# Patient Record
Sex: FEMALE | Race: WHITE | NOT HISPANIC OR LATINO | Employment: OTHER | URBAN - METROPOLITAN AREA
[De-identification: names, ages, dates, MRNs, and addresses within clinical notes are randomized per-mention and may not be internally consistent; named-entity substitution may affect disease eponyms.]

---

## 2017-01-31 ENCOUNTER — TRANSFERRED RECORDS (OUTPATIENT)
Dept: HEALTH INFORMATION MANAGEMENT | Facility: CLINIC | Age: 67
End: 2017-01-31

## 2017-02-28 ENCOUNTER — TRANSFERRED RECORDS (OUTPATIENT)
Dept: HEALTH INFORMATION MANAGEMENT | Facility: CLINIC | Age: 67
End: 2017-02-28

## 2017-08-02 NOTE — PROGRESS NOTES
[address] [address]  [phone]  Dept: [phone]    PRE-OP EVALUATION:  Today's date: 8/3/2017    Suni Avila (: 1950) presents for pre-operative evaluation assessment as requested by Dr. Phong hendrickson.  She requires evaluation and anesthesia risk assessment prior to undergoing surgery/procedure for treatment of foot, toes .  Proposed procedure:     Date of Surgery/ Procedure: 17  Time of Surgery/ Procedure: [facility]  Hospital/Surgical Facility: [facility]    Primary Physician: Akil Pal  Type of Anesthesia Anticipated: General    Patient has a Health Care Directive or Living Will:  NO      The patient is having surgery due to foot pain.  She otherwise feels fine.  No chest pain or shortness of breath and can do 4 mets.                Past Medical History:      Past Medical History:   Diagnosis Date     Breast cancer (H)     left, bilat mast and reconstruction, Dr. Cuevas     Col[place] polyp [place]      DCIS (ductal carcinoma in situ) of breast 2004    had xrt     Screening ,     dexa nl at gyn, dexa nl 10/14             Past Surgical History:      Past Surgical History:   Procedure Laterality Date     APPENDECTOMY       ARTHROPLASTY KNEE UNICOMPARTMENT  2012    Procedure: ARTHROPLASTY KNEE UNICOMPARTMENT;  RIGHT KNEE UNICOMPARTMENTAL ARTHROPLASTY ;  Surgeon: Dakota Haines MD;  Location: [facility]     ARTHROPLASTY KNEE UNICOMPARTMENT Left 2016    Procedure: ARTHROPLASTY KNEE UNICOMPARTMENT;  Surgeon: Dakota Haines MD;  Location:  OR     BREAST RECONSTRUCTION WITH DEEP INFERIOR EPIGASTRIC  (ABRAHAM) FLAP,       bunion surgery   and      BUNIONECTOMY IVETH  2012    Procedure:BUNIONECTOMY IVETH; RIGHT HALLUX VALGUS WITH SECOND CLAWTOE RECONSTRUCTION; Surgeon:PHONG HENDRICKSON; Location:[facility] OR     EXCISE LESION TRUNK N/A 2015    Procedure: EXCISE  "LESION TRUNK;  Surgeon: Jose Luis Hawkins MD;  Location: [facility]     fractured arm  2009     MASTECTOMY BILATERAL, INSERT TISSUE EXPANDER BILATERAL, COMBINED  2008    breast cancer     RECONSTRUCT BREAST Left 11/13/2015    Procedure: RECONSTRUCT BREAST;  Surgeon: Jose Luis Hawkins MD;  Location: [facility]     REVISE RECONSTRUCTED BREAST  1/27/2012    Procedure:REVISE RECONSTRUCTED BREAST; LEFT BREAST CAPSULORRAPHY; Surgeon:JOSE LUIS HAWKINS; Location:[facility]     TOE SURGERY  2014             Social History:     Social History   Substance Use Topics     Smoking status: Former Smoker     Years: 20.00     Quit date: 1/21/2005     Smokeless tobacco: Never Used     Alcohol use 0.0 oz/week     0 Standard drinks or equivalent per week      Comment: 3-4 drinks monthly             Family History:   No family history of bleeding difficulty, anesthesia problems or blood clots.         Allergies:     Allergies   Allergen Reactions     No Known Drug Allergy      Latex Rash             Medications:     Current Outpatient Prescriptions   Medication Sig Dispense Refill     aspirin 325 MG tablet Take 1 tablet (325 mg) by mouth 2 times daily 20 tablet 0     Multiple Vitamins-Minerals (MULTIVITAMIN GUMMIES ADULTS) CHEW Take 2 chew tab by mouth daily       Ibuprofen (ADVIL PO) Take 400-600 mg by mouth nightly as needed                  Review of Systems:   No history of bleeding difficulty, anesthesia problems or blood clots.  The 10 point Review of Systems is negative other than noted in the HPI           Physical Exam:   Blood pressure 128/72, temperature 98.1  F (36.7  C), temperature source Oral, height 5' 5\" (1.651 m), weight 164 lb (74.4 kg), SpO2 97 %, not currently breastfeeding.    Constitutional: healthy appearing, alert and in no distress  Heent: Normocephalic. Head without obvious masses or lesions. PERRLDC, EOMI. Mouth exam within normal limits: tongue, mucous membranes, posterior pharynx all normal, no " lesions or abnormalities seen.  Tm's and canals within normal limits bilaterally. Neck supple, no nuchal rigidity or masses. No supraclavicular, or cervical adenopathy. Thyroid symmetric, no masses.  Cardiovascular: Regular rate and rhythm, no murmer, rub or gallops.  JVP not elevated, no edema.  Carotids within normal limits bilaterally, no bruits.  Respiratory: Normal respiratory effort.  Lungs clear, normal flow, no wheezing or crackles.  Gastrointestinal: Normal active bowel sounds.   Soft, not tender, no masses, guarding or rebound.  No hepatosplenomegaly.   Musculoskeletal: extremities normal, no gross deformities noted.  Skin: no suspicious lesions or rashes   Neurologic: Mental status within normal limits.  Speech fluent.  No gross motor abnormalities and gait intact.  Psychiatric: mentation appears normal and affect normal.         Data:   ekg - nsr, within normal limits.        Assessment:   1. Normal pre op exam, this patient should be low risk for the procedure  2. Health care maintenance  3. Ca breast, juan pablo  4. Colon polyp, will get follow up once recovered         Plan:   The patient is ok for the procedure  Pneumovax today  Hep c screen today  Colon in future      Akil Pal M.D.

## 2017-08-03 ENCOUNTER — OFFICE VISIT (OUTPATIENT)
Dept: FAMILY MEDICINE | Facility: CLINIC | Age: 67
End: 2017-08-03
Payer: MEDICARE

## 2017-08-03 VITALS
HEIGHT: 65 IN | BODY MASS INDEX: 27.32 KG/M2 | DIASTOLIC BLOOD PRESSURE: 72 MMHG | WEIGHT: 164 LBS | SYSTOLIC BLOOD PRESSURE: 128 MMHG | OXYGEN SATURATION: 97 % | TEMPERATURE: 98.1 F

## 2017-08-03 DIAGNOSIS — C50.912 MALIGNANT NEOPLASM OF LEFT FEMALE BREAST, UNSPECIFIED ESTROGEN RECEPTOR STATUS, UNSPECIFIED SITE OF BREAST (H): ICD-10-CM

## 2017-08-03 DIAGNOSIS — K63.5 POLYP OF COLON, UNSPECIFIED PART OF COLON, UNSPECIFIED TYPE: ICD-10-CM

## 2017-08-03 DIAGNOSIS — Z23 NEED FOR VACCINATION: ICD-10-CM

## 2017-08-03 DIAGNOSIS — Z11.59 NEED FOR HEPATITIS C SCREENING TEST: ICD-10-CM

## 2017-08-03 DIAGNOSIS — M79.674 PAIN OF TOE OF RIGHT FOOT: ICD-10-CM

## 2017-08-03 DIAGNOSIS — Z01.818 PREOP GENERAL PHYSICAL EXAM: Primary | ICD-10-CM

## 2017-08-03 DIAGNOSIS — Z23 ENCOUNTER FOR IMMUNIZATION: ICD-10-CM

## 2017-08-03 PROCEDURE — 99214 OFFICE O/P EST MOD 30 MIN: CPT | Mod: 25 | Performed by: INTERNAL MEDICINE

## 2017-08-03 PROCEDURE — 90732 PPSV23 VACC 2 YRS+ SUBQ/IM: CPT | Performed by: INTERNAL MEDICINE

## 2017-08-03 PROCEDURE — G0009 ADMIN PNEUMOCOCCAL VACCINE: HCPCS | Performed by: INTERNAL MEDICINE

## 2017-08-03 PROCEDURE — 93000 ELECTROCARDIOGRAM COMPLETE: CPT | Performed by: INTERNAL MEDICINE

## 2017-08-03 PROCEDURE — 36415 COLL VENOUS BLD VENIPUNCTURE: CPT | Performed by: INTERNAL MEDICINE

## 2017-08-03 PROCEDURE — G0472 HEP C SCREEN HIGH RISK/OTHER: HCPCS | Performed by: INTERNAL MEDICINE

## 2017-08-03 NOTE — PROGRESS NOTES
Screening Questionnaire for Adult Immunization    Are you sick today?   No   Do you have allergies to medications, food, a vaccine component or latex?   No   Have you ever had a serious reaction after receiving a vaccination?   No   Do you have a long-term health problem with heart disease, lung disease, asthma, kidney disease, metabolic disease (e.g. diabetes), anemia, or other blood disorder?   No   Do you have cancer, leukemia, HIV/AIDS, or any other immune system problem?   No   In the past 3 months, have you taken medications that affect  your immune system, such as prednisone, other steroids, or anticancer drugs; drugs for the treatment of rheumatoid arthritis, Crohn s disease, or psoriasis; or have you had radiation treatments?   No   Have you had a seizure, or a brain or other nervous system problem?   No   During the past year, have you received a transfusion of blood or blood     products, or been given immune (gamma) globulin or antiviral drug?   No   For women: Are you pregnant or is there a chance you could become        pregnant during the next month?   No   Have you received any vaccinations in the past 4 weeks?   No     Immunization questionnaire answers were all negative.      MNVFC doesn't apply on this patient    Per orders of Dr. Pal, injection of Pneumovax  given by Tawnya Tadeo. Patient instructed to remain in clinic for 15 minutes afterwards, and to report any adverse reaction to me immediately.       Screening performed by Tawnya Tadeo on 8/3/2017 at 1:38 PM.    Prior to injection verified patient identity using patient's name and date of birth.

## 2017-08-03 NOTE — LETTER
[facility]  [address] [facility]  [address]  [place]  [address]  Tel: [phone]    August 4, 2017    Suni Avila  [address]   [facility] [address]        Dear MsLaura Avila,    It was very nice to see you.  You do not have hepatitis C    If you have any further questions or problems, please contact our office.      Sincerely,    Akil Pal MD/ Tena SHEPPARD CMA  Results for orders placed or performed in visit on 08/03/17   Hepatitis C Screen Reflex to HCV RNA Quant and Genotype   Result Value Ref Range    Hepatitis C Antibody  NR     Nonreactive   Assay performance characteristics have not been established for newborns,   infants, and children                 Enclosure: Lab Results

## 2017-08-03 NOTE — MR AVS SNAPSHOT
After Visit Summary   8/3/2017    Suni Avila    MRN: [id number]           Patient Information     Date Of Birth          1950        Visit Information        Provider Department      8/3/2017 1:00 PM Akil Pal MD [facility]        Today's Diagnoses     Preop general physical exam    -  1      Care Instructions      Before Your Surgery      Call your surgeon if there is any change in your health. This includes signs of a cold or flu (such as a sore throat, runny nose, cough, rash or fever).    Do not smoke, drink alcohol or take over the counter medicine (unless your surgeon or primary care doctor tells you to) for the 24 hours before and after surgery.    If you take prescribed drugs: Follow your doctor s orders about which medicines to take and which to stop until after surgery.    Eating and drinking prior to surgery: follow the instructions from your surgeon    Take a shower or bath the night before surgery. Use the soap your surgeon gave you to gently clean your skin. If you do not have soap from your surgeon, use your regular soap. Do not shave or scrub the surgery site.  Wear clean pajamas and have clean sheets on your bed.           Follow-ups after your visit        Who to contact     If you have questions or need follow up information about today's clinic visit or your schedule please contact [facility] directly at [phone].  Normal or non-critical lab and imaging results will be communicated to you by MyChart, letter or phone within 4 business days after the clinic has received the results. If you do not hear from us within 7 days, please contact the clinic through MyChart or phone. If you have a critical or abnormal lab result, we will notify you by phone as soon as possible.  Submit refill requests through [employer] or call your pharmacy and they will forward the refill request to us. Please allow 3 business days for your refill to be completed.  "         Additional Information About Your Visit        MyChart Information     [employer] lets you send messages to your doctor, view your test results, renew your prescriptions, schedule appointments and more. To sign up, go to [email or web address] . Click on \"Log in\" on the left side of the screen, which will take you to the Welcome page. Then click on \"Sign up Now\" on the right side of the page.     You will be asked to enter the access code listed below, as well as some personal information. Please follow the directions to create your username and password.     Your access code is: [email or web address]  Expires: 2017  1:08 PM     Your access code will  in 90 days. If you need help or a new code, please call your [place] clinic or [phone].        Care EveryWhere ID     This is your Care EveryWhere ID. This could be used by other organizations to access your [place] medical records  [id number]        Your Vitals Were     Temperature Height Pulse Oximetry Breastfeeding? BMI (Body Mass Index)       98.1  F (36.7  C) (Oral) 5' 5\" (1.651 m) 97% No 27.29 kg/m2        Blood Pressure from Last 3 Encounters:   17 128/72   16 144/78   16 122/75    Weight from Last 3 Encounters:   17 164 lb (74.4 kg)   16 165 lb (74.8 kg)   16 165 lb (74.8 kg)              Today, you had the following     No orders found for display         Today's Medication Changes          These changes are accurate as of: 8/3/17  1:09 PM.  If you have any questions, ask your nurse or doctor.               These medicines have changed or have updated prescriptions.        Dose/Directions    ADVIL PO   This may have changed:  Another medication with the same name was removed. Continue taking this medication, and follow the directions you see here.        Dose:  400-600 mg   Take 400-600 mg by mouth nightly as needed   Refills:  0         Stop taking these medicines if you haven't already. Please contact " your care team if you have questions.     acetaminophen-codeine 300-30 MG per tablet   Commonly known as:  TYLENOL #3   Stopped by:  Akil Pal MD           ALEVE PO   Stopped by:  Akil Pal MD           oxyCODONE 5 MG IR tablet   Commonly known as:  ROXICODONE   Stopped by:  kAil Pal MD           senna-docusate 8.6-50 MG per tablet   Commonly known as:  SENOKOT-S;PERICOLACE   Stopped by:  Akil Pal MD                    Primary Care Provider Office Phone # Fax #    Akil Pal [phone]       [facility] [address] [facility] [address]  [facility] [address]        Equal Access to Services     [facility] : Hadii aad ku hadasho Soomaali, waaxda luqadaha, qaybta kaalmada adeegyada, waxay idiin haybassamn craig chen . So [facility] [phone].    ATENCIÓN: Si habla español, tiene a lion disposición servicios gratuitos de asistencia lingüística. Llame al [phone].    We comply with applicable federal civil rights laws and [place] laws. We do not discriminate on the basis of race, color, national origin, age, disability sex, sexual orientation or gender identity.            Thank you!     Thank you for choosing [facility]  for your care. Our goal is always to provide you with excellent care. Hearing back from our patients is one way we can continue to improve our services. Please take a few minutes to complete the written survey that you may receive in the mail after your visit with us. Thank you!             Your Updated Medication List - Protect others around you: Learn how to safely use, store and throw away your medicines at www.disposemymeds.org.          This list is accurate as of: 8/3/17  1:09 PM.  Always use your most recent med list.                   Brand Name Dispense Instructions for use Diagnosis    ADVIL PO      Take 400-600 mg by mouth nightly as needed        aspirin 325 MG tablet     20 tablet    Take 1  tablet (325 mg) by mouth 2 times daily    History of arthroplasty of right knee       MULTIVITAMIN GUMMIES ADULTS Chew      Take 2 chew tab by mouth daily

## 2017-08-03 NOTE — NURSING NOTE
"Chief Complaint   Patient presents with     Pre-Op Exam       Initial /82  Temp 98.1  F (36.7  C) (Oral)  Ht 5' 5\" (1.651 m)  Wt 164 lb (74.4 kg)  SpO2 97%  Breastfeeding? No  BMI 27.29 kg/m2 Estimated body mass index is 27.29 kg/(m^2) as calculated from the following:    Height as of this encounter: 5' 5\" (1.651 m).    Weight as of this encounter: 164 lb (74.4 kg).  Medication Reconciliation: complete   Tena SHEPPARD CMA      "

## 2017-08-04 LAB — HCV AB SERPL QL IA: NORMAL

## 2017-08-04 NOTE — PROGRESS NOTES
It was very nice to see you.  You do not have hepatitis C.  If you have any questions please call me.

## 2017-09-26 ENCOUNTER — TRANSFERRED RECORDS (OUTPATIENT)
Dept: HEALTH INFORMATION MANAGEMENT | Facility: CLINIC | Age: 67
End: 2017-09-26

## 2018-01-09 ENCOUNTER — TRANSFERRED RECORDS (OUTPATIENT)
Dept: HEALTH INFORMATION MANAGEMENT | Facility: CLINIC | Age: 68
End: 2018-01-09

## 2018-10-18 ENCOUNTER — TRANSFERRED RECORDS (OUTPATIENT)
Dept: HEALTH INFORMATION MANAGEMENT | Facility: CLINIC | Age: 68
End: 2018-10-18

## 2018-11-05 ENCOUNTER — RADIANT APPOINTMENT (OUTPATIENT)
Dept: GENERAL RADIOLOGY | Facility: CLINIC | Age: 68
End: 2018-11-05
Attending: INTERNAL MEDICINE
Payer: MEDICARE

## 2018-11-05 ENCOUNTER — OFFICE VISIT (OUTPATIENT)
Dept: FAMILY MEDICINE | Facility: CLINIC | Age: 68
End: 2018-11-05
Payer: MEDICARE

## 2018-11-05 VITALS
SYSTOLIC BLOOD PRESSURE: 144 MMHG | HEART RATE: 67 BPM | BODY MASS INDEX: 28.12 KG/M2 | TEMPERATURE: 97.3 F | DIASTOLIC BLOOD PRESSURE: 88 MMHG | OXYGEN SATURATION: 97 % | WEIGHT: 169 LBS

## 2018-11-05 DIAGNOSIS — R06.02 SOB (SHORTNESS OF BREATH): ICD-10-CM

## 2018-11-05 DIAGNOSIS — C50.912 MALIGNANT NEOPLASM OF LEFT FEMALE BREAST, UNSPECIFIED ESTROGEN RECEPTOR STATUS, UNSPECIFIED SITE OF BREAST (H): ICD-10-CM

## 2018-11-05 DIAGNOSIS — R03.0 ELEVATED BP WITHOUT DIAGNOSIS OF HYPERTENSION: ICD-10-CM

## 2018-11-05 DIAGNOSIS — R05.9 COUGH: Primary | ICD-10-CM

## 2018-11-05 DIAGNOSIS — R05.9 COUGH: ICD-10-CM

## 2018-11-05 PROCEDURE — 99213 OFFICE O/P EST LOW 20 MIN: CPT | Performed by: INTERNAL MEDICINE

## 2018-11-05 PROCEDURE — 71046 X-RAY EXAM CHEST 2 VIEWS: CPT

## 2018-11-05 NOTE — MR AVS SNAPSHOT
After Visit Summary   11/5/2018    Suni Avila    MRN: [id number]           Patient Information     Date Of Birth          1950        Visit Information        Provider Department      11/5/2018 12:30 PM Akil Pal MD [facility]        Today's Diagnoses     Cough    -  1    SOB (shortness of breath)        Elevated BP without diagnosis of hypertension        Malignant neoplasm of left female breast, unspecified estrogen receptor status, unspecified site of breast (H)           Follow-ups after your visit        Follow-up notes from your care team     Return in about 1 year (around 11/5/2019) for Physical Exam.      Future tests that were ordered for you today     Open Future Orders        Priority Expected Expires Ordered    XR Chest 2 Views Routine 11/5/2018 11/5/2019 11/5/2018            Who to contact     If you have questions or need follow up information about today's clinic visit or your schedule please contact [facility] directly at [phone].  Normal or non-critical lab and imaging results will be communicated to you by MyChart, letter or phone within 4 business days after the clinic has received the results. If you do not hear from us within 7 days, please contact the clinic through MyChart or phone. If you have a critical or abnormal lab result, we will notify you by phone as soon as possible.  Submit refill requests through [employer] or call your pharmacy and they will forward the refill request to us. Please allow 3 business days for your refill to be completed.          Additional Information About Your Visit        Care EveryWhere ID     This is your Care EveryWhere ID. This could be used by other organizations to access your [place] medical records  [id number]        Your Vitals Were     Pulse Temperature Pulse Oximetry BMI (Body Mass Index)          67 97.3  F (36.3  C) (Tympanic) 97% 28.12 kg/m2         Blood Pressure from Last 3  Encounters:   11/05/18 144/88   08/03/17 128/72   05/25/16 144/78    Weight from Last 3 Encounters:   11/05/18 169 lb (76.7 kg)   08/03/17 164 lb (74.4 kg)   05/24/16 165 lb (74.8 kg)                 Today's Medication Changes          These changes are accurate as of 11/5/18 12:45 PM.  If you have any questions, ask your nurse or doctor.               Stop taking these medicines if you haven't already. Please contact your care team if you have questions.     ADVIL PO   Stopped by:  Akil Pal MD                    Primary Care Provider Office Phone # Fax #    Akil Pal [phone] [address] [address]        Equal Access to Services     [facility]: Ivory madseno Sotyler, waaxda luqadaha, qaybta kaalmakylee mejia, clark chen . So [facility] [phone].    ATENCIÓN: Si habla español, tiene a lion disposición servicios gratuitos de asistencia lingüística. Yolis al [phone].    We comply with applicable federal civil rights laws and [place] laws. We do not discriminate on the basis of race, color, national origin, age, disability, sex, sexual orientation, or gender identity.            Thank you!     Thank you for choosing [facility]  for your care. Our goal is always to provide you with excellent care. Hearing back from our patients is one way we can continue to improve our services. Please take a few minutes to complete the written survey that you may receive in the mail after your visit with us. Thank you!             Your Updated Medication List - Protect others around you: Learn how to safely use, store and throw away your medicines at www.disposemymeds.org.          This list is accurate as of 11/5/18 12:45 PM.  Always use your most recent med list.                   Brand Name Dispense Instructions for use Diagnosis    aspirin 325 MG tablet     20 tablet    Take 1 tablet (325 mg) by mouth 2  times daily    History of arthroplasty of right knee       MULTIVITAMIN GUMMIES ADULTS Chew      Take 2 chew tab by mouth daily

## 2018-11-05 NOTE — PROGRESS NOTES
The patient is here for a cough.  She had a cold in September with head congestion, sore throat and a cough that lingered but then went away for a couple days.  However, as of early October she has had a nagging nocturnal cough.  She is not coughing during the day.  She has no history of lung disease and she does not smoke.  She is not having any earache or sore throat.  No fevers or night sweats.    This last Saturday she woke in the middle of the night feeling short of breath and coughing.  She was not having chest pain.  Since then she has not had any more shortness of breath.  She has not had chest pain, PND or edema.  No fevers or night sweats.  The cough is not productive.  Some nasal congestion but no facial pain or pressure.  No travel.  No nausea or vomiting.  Of note her  was just diagnosed with pneumonia.    Her blood pressure is also slightly elevated today.  It has not been in the past.    She has history of breast cancer but this is not been active in quite some time.    Past Medical History:   Diagnosis Date     Colonic polyp March 2012    [place] fu 2017     DCIS (ductal carcinoma in situ) of breast 2004    had xrt     History of breast cancer 2007    left, bilat mast and reconstruction, Dr. Cuevas     Screening 2003, 2014    dexa nl at gyn, dexa nl 10/14     Past Surgical History:   Procedure Laterality Date     APPENDECTOMY  2002     ARTHROPLASTY KNEE UNICOMPARTMENT  9/13/2012    Procedure: ARTHROPLASTY KNEE UNICOMPARTMENT;  RIGHT KNEE UNICOMPARTMENTAL ARTHROPLASTY ;  Surgeon: Dakota Haines MD;  Location:  OR     ARTHROPLASTY KNEE UNICOMPARTMENT Left 5/24/2016    Procedure: ARTHROPLASTY KNEE UNICOMPARTMENT;  Surgeon: Dakota Haines MD;  Location:  OR     BREAST RECONSTRUCTION WITH DEEP INFERIOR EPIGASTRIC  (ABRAHAM) FLAP,  2009     bunion surgery  1975 and 2007     BUNIONECTOMY IVETH  5/9/2012    Procedure:BUNIONECTOMY IVETH; RIGHT HALLUX VALGUS WITH  SECOND CLAWTOE RECONSTRUCTION; Surgeon:PHONG HENDRICKSON; Location: OR     EXCISE LESION TRUNK N/A 11/13/2015    Procedure: EXCISE LESION TRUNK;  Surgeon: Jose Luis Hawkins MD;  Location: [facility]     FOOT SURGERY  08/2017    at o     fractured arm  2009     MASTECTOMY BILATERAL, INSERT TISSUE EXPANDER BILATERAL, COMBINED  2008    breast cancer     RECONSTRUCT BREAST Left 11/13/2015    Procedure: RECONSTRUCT BREAST;  Surgeon: Jose Luis Hawkins MD;  Location: [facility]     REVISE RECONSTRUCTED BREAST  1/27/2012    Procedure:REVISE RECONSTRUCTED BREAST; LEFT BREAST CAPSULORRAPHY; Surgeon:JOSE LUIS HAWKINS; Location:[facility]     TOE SURGERY  2014     Social History     Social History     Marital status:      Spouse name: N/A     Number of children: 1     Years of education: N/A     Occupational History     works dental lab      Social History Main Topics     Smoking status: Former Smoker     Years: 20.00     Quit date: 1/21/2005     Smokeless tobacco: Never Used     Alcohol use 0.0 oz/week     0 Standard drinks or equivalent per week      Comment: 3-4 drinks monthly     Drug use: No     Sexual activity: Yes     Partners: Male     Other Topics Concern     Not on file     Social History Narrative     Current Outpatient Prescriptions   Medication Sig Dispense Refill     aspirin 325 MG tablet Take 1 tablet (325 mg) by mouth 2 times daily 20 tablet 0     Multiple Vitamins-Minerals (MULTIVITAMIN GUMMIES ADULTS) CHEW Take 2 chew tab by mouth daily       Allergies   Allergen Reactions     No Known Drug Allergy      Latex Rash     FAMILY HISTORY NOTED AND REVIEWED    REVIEW OF SYSTEMS: above    PHYSICAL EXAM    /88  Pulse 67  Temp 97.3  F (36.3  C) (Tympanic)  Wt 169 lb (76.7 kg)  SpO2 97%  BMI 28.12 kg/m2    Patient appears non toxic  Tympanic membranes and canals: within normal limits bilaterally.   Mouth: Posterior pharynx, mucous membranes and tongue exam within normal limits.  Neck:  supple, no nuchal rigidity or masses.  No anterior or posterior cervical adenopathy.    Lungs: clear, normal flow and effort.  cv reglar rate and rhythm, no murmer, rub or gallop, no jvp or edema  Abdomen non-tender    cxr to be done    ASSESSMENT:  1. Prolonged cough, suspect viral, doubt pneumonia, pulmonary embolis, tumor, chf, may be post nasal discontinue, doubt sinusitis, doubt asthma, copd  2. Shortness of breath, doubt pulmonary embolis, chf, etc, suspect due to the cough, neg exam now  3. Elevated blood pressure, she will monitor it and call if up  4. Breast ca, juan pablo    PLAN:  cxr  If cxr neg then try flonase, but call if worsens, more shortness of breath or not gone soon  Monitor blood pressure     Akil Pal M.D.

## 2018-11-06 ENCOUNTER — TELEPHONE (OUTPATIENT)
Dept: FAMILY MEDICINE | Facility: CLINIC | Age: 68
End: 2018-11-06

## 2018-11-06 DIAGNOSIS — R05.9 COUGH: Primary | ICD-10-CM

## 2018-11-12 ENCOUNTER — HOSPITAL ENCOUNTER (OUTPATIENT)
Dept: RESPIRATORY THERAPY | Facility: CLINIC | Age: 68
Discharge: HOME | End: 2018-11-12
Attending: INTERNAL MEDICINE | Admitting: INTERNAL MEDICINE
Payer: MEDICARE

## 2018-11-12 VITALS — OXYGEN SATURATION: 93 %

## 2018-11-12 DIAGNOSIS — R05.9 COUGH: ICD-10-CM

## 2018-11-12 LAB
DLCOUNC-PRED: 20.7 ML/MIN/MMHG
ERV-%PRED-PRE: 96 %
ERV-PRE: 0.59 L
ERV-PRED: 0.61 L
EXPTIME-PRE: 9.52 SEC
FEF2575-%PRED-POST: 112 %
FEF2575-%PRED-PRE: 107 %
FEF2575-POST: 2.16 L/SEC
FEF2575-PRE: 2.07 L/SEC
FEF2575-PRED: 1.93 L/SEC
FEFMAX-%PRED-PRE: 124 %
FEFMAX-PRE: 7.16 L/SEC
FEFMAX-PRED: 5.77 L/SEC
FEV1-%PRED-PRE: 124 %
FEV1-PRE: 2.81 L
FEV1FEV6-PRE: 75 %
FEV1FEV6-PRED: 79 %
FEV1FVC-PRE: 73 %
FEV1FVC-PRED: 76 %
FEV1SVC-PRE: 73 %
FEV1SVC-PRED: 73 %
FIFMAX-PRE: 5.47 L/SEC
FRCPLETH-%PRED-PRE: 111 %
FRCPLETH-PRE: 3.03 L
FRCPLETH-PRED: 2.71 L
FVC-%PRED-PRE: 131 %
FVC-PRE: 3.82 L
FVC-PRED: 2.9 L
IC-%PRED-PRE: 130 %
IC-PRE: 3.25 L
IC-PRED: 2.49 L
RVPLETH-%PRED-PRE: 119 %
RVPLETH-PRE: 2.44 L
RVPLETH-PRED: 2.04 L
TLCPLETH-%PRED-PRE: 127 %
TLCPLETH-PRE: 6.28 L
TLCPLETH-PRED: 4.94 L
VC-%PRED-PRE: 123 %
VC-PRE: 3.84 L
VC-PRED: 3.1 L

## 2018-11-12 PROCEDURE — 94729 DIFFUSING CAPACITY: CPT

## 2018-11-12 PROCEDURE — 40000275 ZZH STATISTIC RCP TIME EA 10 MIN

## 2018-11-12 PROCEDURE — 94726 PLETHYSMOGRAPHY LUNG VOLUMES: CPT

## 2018-11-12 PROCEDURE — 94060 EVALUATION OF WHEEZING: CPT

## 2018-11-12 PROCEDURE — 25000125 ZZHC RX 250

## 2018-11-12 RX ORDER — ALBUTEROL SULFATE 0.83 MG/ML
SOLUTION RESPIRATORY (INHALATION)
Status: COMPLETED
Start: 2018-11-12 | End: 2018-11-12

## 2018-11-12 RX ORDER — ALBUTEROL SULFATE 0.83 MG/ML
2.5 SOLUTION RESPIRATORY (INHALATION)
Status: COMPLETED | OUTPATIENT
Start: 2018-11-12 | End: 2018-11-12

## 2018-11-12 RX ADMIN — ALBUTEROL SULFATE 2.5 MG: 2.5 SOLUTION RESPIRATORY (INHALATION) at 14:11

## 2018-11-12 RX ADMIN — ALBUTEROL SULFATE 2.5 MG: 0.83 SOLUTION RESPIRATORY (INHALATION) at 14:11

## 2018-11-12 NOTE — PROGRESS NOTES
PFT Note:        Pt completed pulmonary function testing with DLCO.  Pre/post flow volume loops with 2.5mg Albuterol nebulizer.  Good Pt effort and cooperation.     Spirometry  Meets all ATS-ERS recommendations.    Plethysmography  All plethysmographic measurements meet ATS-ERS recommendations.      DLCO  Meets all ATS-ERS recommendations.  DLCO is an average of 2 maneuvers.  No recent Hgb for DLCO correction.    November 12, 2018.2:46 PM  Yair Cash

## 2018-11-13 NOTE — PROCEDURES
Procedure Date: 2018      Please see medical chart for graphs and statistics related to this report.       REFERRING PHYSICIAN:   Akil Pal                 TECHNICIAN:   Yair Cash   DIAGNOSIS:   Coughing, SARAVIA   HEIGHT:   64.00 inches                  WEIGHT:   163.00 Lbs.      DYSPNEA:   Walking less than 100 yards   COUGH:  Productive   WHEEZE:   Rare      TOBACCO PROD:   Cigarette   YEARS SMOKED:   29.0   PKS/DAY:   0.2   YEARS QUIT:    10.0                                                              MEDICATIONS:           POST-TEST COMMENTS:   Patient completed pulmonary function testing with DLCO.  Pre/post flow  volume loops with 2.5mg Albuterol  nebulizer.  Good  patient effort t and cooperation.  All testing meets ATS-ERS recommendations.  DLCO is an average of 2 maneuvers.  No recent Hgb for DLCO correction.       INTERPRETATION:      1.  Normal lung mechanics   2.  No obstructions   3.  Borderline air trapping   4.  Hyperinflation   5.  Normal gas transfer         POLY GARRETT MD             D: 2018   T: 2018   MT: MICHAEL      Name:     KELSIE PURVIS   MRN:      [id number]        Account:        [id number]   :      1950           Procedure Date: 2018      Document: [id number]       cc: Akil Pal MD

## 2018-11-14 ENCOUNTER — TELEPHONE (OUTPATIENT)
Dept: FAMILY MEDICINE | Facility: CLINIC | Age: 68
End: 2018-11-14

## 2018-11-14 DIAGNOSIS — R05.9 COUGH: Primary | ICD-10-CM

## 2018-11-14 RX ORDER — ALBUTEROL SULFATE 90 UG/1
2 AEROSOL, METERED RESPIRATORY (INHALATION) EVERY 6 HOURS PRN
Qty: 1 INHALER | Refills: 0 | Status: SHIPPED | OUTPATIENT
Start: 2018-11-14 | End: 2019-10-29

## 2018-11-14 RX ORDER — PREDNISONE 20 MG/1
20 TABLET ORAL DAILY
Qty: 5 TABLET | Refills: 0 | Status: SHIPPED | OUTPATIENT
Start: 2018-11-14 | End: 2019-10-29

## 2018-11-14 NOTE — TELEPHONE ENCOUNTER
FYI PCP:  Spoke with patient:   Denies fever or SOB    Coughing jags mostly just at night, cough medicine at night, a little wheezing with this - improved (less frequent) a little bit, continuing to improve   Notified   Notified of scripts sent to pharmacy - briefly discussed side effects of meds    Marlene SHEPPARD RN

## 2018-11-14 NOTE — TELEPHONE ENCOUNTER
Please make sure no fever or shortness of breath.  Let's try adding prednisone for 5 days and using albuterol prn, but call if not resolving soon or worsens    Akil Pal M.D.

## 2018-11-14 NOTE — TELEPHONE ENCOUNTER
Informed patient that her pulmonary function test looked fine. Patient expressed concern with having coughing fits still roughly 2-3 times a week and they are only at night. Is there anything the patient should do to help resolve these symptoms?    Chandu Adame CMA on 11/14/2018 at 9:18 AM

## 2018-11-23 NOTE — TELEPHONE ENCOUNTER
Pt reports she feels better, still has a little cough, at night, feels like it's from drainage however is much improved.   Denies sob/breathing difficulty or any other concerns.      Completed prednisone rx, did not use inhaler, as during PFT neb didin't do anything and reports she was advised against inhaler for that reason by pulmonary team.    Advised pt to call if any worsening symptoms again, or if cough continues ongoing with no further improvement.  Jena Juarez RN

## 2018-11-23 NOTE — TELEPHONE ENCOUNTER
Please call patient re her cough.  Has it resolved?  Her pulmonary function tests look fine.  Let me know please    Akil Pal M.D.

## 2019-02-19 ENCOUNTER — TRANSFERRED RECORDS (OUTPATIENT)
Dept: HEALTH INFORMATION MANAGEMENT | Facility: CLINIC | Age: 69
End: 2019-02-19

## 2019-07-22 ENCOUNTER — TRANSFERRED RECORDS (OUTPATIENT)
Dept: HEALTH INFORMATION MANAGEMENT | Facility: CLINIC | Age: 69
End: 2019-07-22

## 2019-07-31 ENCOUNTER — TELEPHONE (OUTPATIENT)
Dept: FAMILY MEDICINE | Facility: CLINIC | Age: 69
End: 2019-07-31

## 2019-07-31 NOTE — TELEPHONE ENCOUNTER
Panel Management Review      Patient has the following on her problem list: None      Composite cancer screening  Chart review shows that this patient is due/due soon for the following None  Summary:    Patient is due/failing the following:   none    Action needed:   none    Type of outreach:    none    Questions for provider review:    None                                                                                                                                    Tawnya Schreiber CMA       Chart routed to Care Team .

## 2019-10-29 ENCOUNTER — OFFICE VISIT (OUTPATIENT)
Dept: FAMILY MEDICINE | Facility: CLINIC | Age: 69
End: 2019-10-29
Payer: MEDICARE

## 2019-10-29 VITALS
HEIGHT: 64 IN | SYSTOLIC BLOOD PRESSURE: 151 MMHG | TEMPERATURE: 97.7 F | HEART RATE: 63 BPM | DIASTOLIC BLOOD PRESSURE: 82 MMHG | BODY MASS INDEX: 28.51 KG/M2 | WEIGHT: 167 LBS | OXYGEN SATURATION: 96 %

## 2019-10-29 DIAGNOSIS — Z00.00 ROUTINE GENERAL MEDICAL EXAMINATION AT A HEALTH CARE FACILITY: Primary | ICD-10-CM

## 2019-10-29 DIAGNOSIS — C50.912 MALIGNANT NEOPLASM OF LEFT FEMALE BREAST, UNSPECIFIED ESTROGEN RECEPTOR STATUS, UNSPECIFIED SITE OF BREAST (H): ICD-10-CM

## 2019-10-29 DIAGNOSIS — K63.5 POLYP OF COLON, UNSPECIFIED PART OF COLON, UNSPECIFIED TYPE: ICD-10-CM

## 2019-10-29 DIAGNOSIS — R03.0 ELEVATED BP WITHOUT DIAGNOSIS OF HYPERTENSION: ICD-10-CM

## 2019-10-29 DIAGNOSIS — R00.2 PALPITATIONS: ICD-10-CM

## 2019-10-29 LAB
ALBUMIN SERPL-MCNC: 3.7 G/DL (ref 3.4–5)
ALP SERPL-CCNC: 141 U/L (ref 40–150)
ALT SERPL W P-5'-P-CCNC: 30 U/L (ref 0–50)
ANION GAP SERPL CALCULATED.3IONS-SCNC: 5 MMOL/L (ref 3–14)
AST SERPL W P-5'-P-CCNC: 20 U/L (ref 0–45)
BILIRUB SERPL-MCNC: 0.5 MG/DL (ref 0.2–1.3)
BUN SERPL-MCNC: 17 MG/DL (ref 7–30)
CALCIUM SERPL-MCNC: 8.5 MG/DL (ref 8.5–10.1)
CHLORIDE SERPL-SCNC: 109 MMOL/L (ref 94–109)
CHOLEST SERPL-MCNC: 201 MG/DL
CO2 SERPL-SCNC: 25 MMOL/L (ref 20–32)
CREAT SERPL-MCNC: 0.66 MG/DL (ref 0.52–1.04)
ERYTHROCYTE [DISTWIDTH] IN BLOOD BY AUTOMATED COUNT: 13.5 % (ref 10–15)
GFR SERPL CREATININE-BSD FRML MDRD: 90 ML/MIN/{1.73_M2}
GLUCOSE SERPL-MCNC: 91 MG/DL (ref 70–99)
HCT VFR BLD AUTO: 40.7 % (ref 35–47)
HDLC SERPL-MCNC: 70 MG/DL
HGB BLD-MCNC: 13.4 G/DL (ref 11.7–15.7)
LDLC SERPL CALC-MCNC: 114 MG/DL
MCH RBC QN AUTO: 29.8 PG (ref 26.5–33)
MCHC RBC AUTO-ENTMCNC: 32.9 G/DL (ref 31.5–36.5)
MCV RBC AUTO: 90 FL (ref 78–100)
NONHDLC SERPL-MCNC: 131 MG/DL
PLATELET # BLD AUTO: 234 10E9/L (ref 150–450)
POTASSIUM SERPL-SCNC: 4.3 MMOL/L (ref 3.4–5.3)
PROT SERPL-MCNC: 6.7 G/DL (ref 6.8–8.8)
RBC # BLD AUTO: 4.5 10E12/L (ref 3.8–5.2)
SODIUM SERPL-SCNC: 139 MMOL/L (ref 133–144)
TRIGL SERPL-MCNC: 85 MG/DL
TSH SERPL DL<=0.005 MIU/L-ACNC: 1.93 MU/L (ref 0.4–4)
WBC # BLD AUTO: 5.9 10E9/L (ref 4–11)

## 2019-10-29 PROCEDURE — 85027 COMPLETE CBC AUTOMATED: CPT | Performed by: INTERNAL MEDICINE

## 2019-10-29 PROCEDURE — 36415 COLL VENOUS BLD VENIPUNCTURE: CPT | Performed by: INTERNAL MEDICINE

## 2019-10-29 PROCEDURE — 84443 ASSAY THYROID STIM HORMONE: CPT | Performed by: INTERNAL MEDICINE

## 2019-10-29 PROCEDURE — 99213 OFFICE O/P EST LOW 20 MIN: CPT | Mod: 25 | Performed by: INTERNAL MEDICINE

## 2019-10-29 PROCEDURE — 80061 LIPID PANEL: CPT | Performed by: INTERNAL MEDICINE

## 2019-10-29 PROCEDURE — 80053 COMPREHEN METABOLIC PANEL: CPT | Performed by: INTERNAL MEDICINE

## 2019-10-29 PROCEDURE — G0438 PPPS, INITIAL VISIT: HCPCS | Performed by: INTERNAL MEDICINE

## 2019-10-29 PROCEDURE — 93000 ELECTROCARDIOGRAM COMPLETE: CPT | Performed by: INTERNAL MEDICINE

## 2019-10-29 ASSESSMENT — ACTIVITIES OF DAILY LIVING (ADL): CURRENT_FUNCTION: NO ASSISTANCE NEEDED

## 2019-10-29 ASSESSMENT — MIFFLIN-ST. JEOR: SCORE: 1267.51

## 2019-10-29 NOTE — LETTER
[facility]  [address] [facility]  [address]  [place]  [address]  Tel: [phone]    October 30, 2019    Suni Avila  [address]   [facility] [address]        Dear Ms. Avila,    It was a pleasure seeing you for your physical examination.  I wanted to get back to you with your test results.  I have enclosed a copy for your review.     I am happy to report that your cbc or complete blood count is normal with no signs of anemia, leukemia or platelet abnormalities. Your chemistry panel shows no signs of diabetes.  Your blood salts, kidney tests, liver tests, thyroid test, and proteins are all fine.    Your total cholesterol is 201 with the normal range being below 200.  Your HDL or good cholesterol is 70 with the normal range being above 50.  Your LDL or bad cholesterol is 114 with the normal range being below 130.  Given the good amount of hdl the numbers are fine.    I am happy to bring you this overall excellent report.  Please get the stress test and monitor your blood pressure.  If you have any questions please call me.  Sincerely,    Akil Pal MD/ELIO          Enclosure: Lab Results  Results for orders placed or performed in visit on 10/29/19   CBC with platelets   Result Value Ref Range    WBC 5.9 4.0 - 11.0 10e9/L    RBC Count 4.50 3.8 - 5.2 10e12/L    Hemoglobin 13.4 11.7 - 15.7 g/dL    Hematocrit 40.7 35.0 - 47.0 %    MCV 90 78 - 100 fl    MCH 29.8 26.5 - 33.0 pg    MCHC 32.9 31.5 - 36.5 g/dL    RDW 13.5 10.0 - 15.0 %    Platelet Count 234 150 - 450 10e9/L   Comprehensive metabolic panel   Result Value Ref Range    Sodium 139 133 - 144 mmol/L    Potassium 4.3 3.4 - 5.3 mmol/L    Chloride 109 94 - 109 mmol/L    Carbon Dioxide 25 20 - 32 mmol/L    Anion Gap 5 3 - 14 mmol/L    Glucose 91 70 - 99 mg/dL    Urea Nitrogen 17 7 - 30 mg/dL    Creatinine 0.66 0.52 - 1.04 mg/dL    GFR Estimate 90 >60 mL/min/[1.73_m2]    GFR Estimate If Black >90 >60 mL/min/[1.73_m2]    Calcium 8.5 8.5  - 10.1 mg/dL    Bilirubin Total 0.5 0.2 - 1.3 mg/dL    Albumin 3.7 3.4 - 5.0 g/dL    Protein Total 6.7 (L) 6.8 - 8.8 g/dL    Alkaline Phosphatase 141 40 - 150 U/L    ALT 30 0 - 50 U/L    AST 20 0 - 45 U/L   Lipid panel reflex to direct LDL Non-fasting   Result Value Ref Range    Cholesterol 201 (H) <200 mg/dL    Triglycerides 85 <150 mg/dL    HDL Cholesterol 70 >49 mg/dL    LDL Cholesterol Calculated 114 (H) <100 mg/dL    Non HDL Cholesterol 131 (H) <130 mg/dL   TSH with free T4 reflex   Result Value Ref Range    TSH 1.93 0.40 - 4.00 mU/L

## 2019-10-29 NOTE — PROGRESS NOTES
SUBJECTIVE:   Suni Avila is a 69 year old female who presents for Preventive Visit.      Overall she is doing quite well.  However, she has had some heart symptoms.  They come and go usually under stress.  It is 1 month or more in between.  She does not have chest pain or shortness of breath or dizziness.  She just feels her heart is beating harder.  Is not really beating faster.    She otherwise feels quite well.  She does work out some.  She needs a colon exam.  Her blood pressure is elevated but on her checks it is been fine.  She leaves for [place] in December.  She sees gyn               Past Medical History:      Past Medical History:   Diagnosis Date     Colonic polyp March 2012    [place] [facility], tubular adenoma, fu 2017     DCIS (ductal carcinoma in situ) of breast 2004    had xrt     History of breast cancer 2007    left, bilat mast and reconstruction, Dr. Cuevas     Screening 2003, 2014    dexa nl at gyn, dexa nl 10/14             Past Surgical History:      Past Surgical History:   Procedure Laterality Date     APPENDECTOMY  2002     ARTHROPLASTY KNEE UNICOMPARTMENT  9/13/2012    Procedure: ARTHROPLASTY KNEE UNICOMPARTMENT;  RIGHT KNEE UNICOMPARTMENTAL ARTHROPLASTY ;  Surgeon: Dakota Haines MD;  Location:  OR     ARTHROPLASTY KNEE UNICOMPARTMENT Left 5/24/2016    Procedure: ARTHROPLASTY KNEE UNICOMPARTMENT;  Surgeon: Dakota Haines MD;  Location:  OR     BREAST RECONSTRUCTION WITH DEEP INFERIOR EPIGASTRIC  (ABRAHAM) FLAP,  2009     bunion surgery  1975 and 2007     BUNIONECTOMY IVETH  5/9/2012    Procedure:BUNIONECTOMY IVETH; RIGHT HALLUX VALGUS WITH SECOND CLAWTOE RECONSTRUCTION; Surgeon:PHONG HENDRICKSON; Location: OR     EXCISE LESION TRUNK N/A 11/13/2015    Procedure: EXCISE LESION TRUNK;  Surgeon: Jose Luis Hawkins MD;  Location:  [facility]     FOOT SURGERY  08/2017    at [facility]     fractured arm  2009     MASTECTOMY BILATERAL, INSERT TISSUE EXPANDER BILATERAL, COMBINED  2008     breast cancer     RECONSTRUCT BREAST Left 2015    Procedure: RECONSTRUCT BREAST;  Surgeon: Jose Luis Hawkins MD;  Location: [facility]     REVISE RECONSTRUCTED BREAST  2012    Procedure:REVISE RECONSTRUCTED BREAST; LEFT BREAST CAPSULORRAPHY; Surgeon:JOSE LUIS HAWKINS; Location:[facility]     TOE SURGERY               Social History:     Social History     Socioeconomic History     Marital status:      Spouse name: Not on file     Number of children: 1     Years of education: Not on file     Highest education level: Not on file   Occupational History     Occupation: works dental lab   Social Needs     Financial resource strain: Not on file     Food insecurity:     Worry: Not on file     Inability: Not on file     Transportation needs:     Medical: Not on file     Non-medical: Not on file   Tobacco Use     Smoking status: Former Smoker     Years: 20.00     Last attempt to quit: 2005     Years since quittin.7     Smokeless tobacco: Never Used   Substance and Sexual Activity     Alcohol use: Yes     Alcohol/week: 0.0 standard drinks     Comment: 3-4 drinks monthly     Drug use: No     Sexual activity: Yes     Partners: Male   Lifestyle     Physical activity:     Days per week: Not on file     Minutes per session: Not on file     Stress: Not on file   Relationships     Social connections:     Talks on phone: Not on file     Gets together: Not on file     Attends Sabianism service: Not on file     Active member of club or organization: Not on file     Attends meetings of clubs or organizations: Not on file     Relationship status: Not on file     Intimate partner violence:     Fear of current or ex partner: Not on file     Emotionally abused: Not on file     Physically abused: Not on file     Forced sexual activity: Not on file   Other Topics Concern     Parent/sibling w/ CABG, MI or angioplasty before 65F 55M? Not Asked   Social History Narrative     Not on file             Family  "History:   reviewed         Allergies:     Allergies   Allergen Reactions     No Known Drug Allergy      Latex Rash             Medications:     Current Outpatient Medications   Medication Sig Dispense Refill     Multiple Vitamins-Minerals (MULTIVITAMIN GUMMIES ADULTS) CHEW Take 2 chew tab by mouth daily                 Review of Systems:   The 10 point Review of Systems is negative other than noted in the HPI           Physical Exam:   Blood pressure (!) 151/82, pulse 63, temperature 97.7  F (36.5  C), temperature source Oral, height 1.626 m (5' 4\"), weight 75.8 kg (167 lb), SpO2 96 %, not currently breastfeeding.    Exam:  Constitutional: healthy appearing, alert and in no distress  Heent: Normocephalic. Head without obvious masses or lesions. PERRLDC, EOMI. Mouth exam within normal limits: tongue, mucous membranes, posterior pharynx all normal, no lesions or abnormalities seen.  Tm's and canals within normal limits bilaterally. Neck supple, no nuchal rigidity or masses. No supraclavicular, or cervical adenopathy. Thyroid symmetric, no masses.  Cardiovascular: Regular rate and rhythm, no murmer, rub or gallops.  JVP not elevated, no edema.  Carotids within normal limits bilaterally, no bruits.  Respiratory: Normal respiratory effort.  Lungs clear, normal flow, no wheezing or crackles.  Gastrointestinal: Normal active bowel sounds.   Soft, not tender, no masses, guarding or rebound.  No hepatosplenomegaly.   Musculoskeletal: extremities normal, no gross deformities noted.  Skin: no suspicious lesions or rashes   Neurologic: Mental status within normal limits.  Speech fluent.  No gross motor abnormalities and gait intact.  Psychiatric: mentation appears normal and affect normal.         Data:   Labs sent; ekg - sinus jeff, lahb        Assessment:   1. Normal complete physical exam  2. Palp, doubt significant but will check est to be safe  3. Breast ca, juan pablo  4. Colon polyp, follow up ordered  5. Elevated blood " "pressure, monitor it and call if up  6. hcm         Plan:   shingrix at pharm  Est echo  Monitor blood pressure  Exercise, diet  Letter with labs      Akil Pal M.D.              Are you in the first 12 months of your Medicare coverage?  No    Healthy Habits:    In general, how would you rate your overall health?  Very good    Frequency of exercise:  4-5 days/week    Duration of exercise:  45-60 minutes    Do you usually eat at least 4 servings of fruit and vegetables a day, include whole grains    & fiber and avoid regularly eating high fat or \"junk\" foods?  Yes    Taking medications regularly:  Not Applicable    Barriers to taking medications:  Not applicable    Medication side effects:  Not applicable    Ability to successfully perform activities of daily living:  No assistance needed    Home Safety:  No safety concerns identified    Hearing Impairment:  No hearing concerns    In the past 6 months, have you been bothered by leaking of urine?  No    In general, how would you rate your overall mental or emotional health?  Very good      PHQ-2 Total Score:    Do you feel safe in your environment? Yes    Do you have a Health Care Directive? Yes: Advance Directive has been received and scanned.      Fall risk  Fallen 2 or more times in the past year?: No  Any fall with injury in the past year?: No    Cognitive Screening   1) Repeat 3 items (Leader, Season, Table)    2) Clock draw: NORMAL  3) 3 item recall: Recalls 3 objects  Results: 3 items recalled: COGNITIVE IMPAIRMENT LESS LIKELY    Mini-CogTM Copyright S Len. Licensed by the author for use in [facility]; reprinted with permission (kayce@.[facility]). All rights reserved.      Do you have sleep apnea, excessive snoring or daytime drowsiness?: no    Reviewed and updated as needed this visit by clinical staff  Allergies  Meds         Reviewed and updated as needed this visit by Provider        Social History     Tobacco Use     Smoking status: " "Former Smoker     Years: 20.00     Last attempt to quit: 2005     Years since quittin.7     Smokeless tobacco: Never Used   Substance Use Topics     Alcohol use: Yes     Alcohol/week: 0.0 standard drinks     Comment: 3-4 drinks monthly     If you drink alcohol do you typically have >3 drinks per day or >7 drinks per week? No    No flowsheet data found.            Current providers sharing in care for this patient include:   Patient Care Team:  Akil Pal MD as PCP - General (Internal Medicine)  Akil Pal MD as Assigned PCP    The following health maintenance items are reviewed in Epic and correct as of today:  Health Maintenance   Topic Date Due     MEDICARE ANNUAL WELLNESS VISIT  2015     ZOSTER IMMUNIZATION (2 of 3) 2015     ADVANCE CARE PLANNING  2017     FALL RISK ASSESSMENT  2018     PHQ-2  2019     INFLUENZA VACCINE (1) 2019     LIPID  2021     COLONOSCOPY  2022     DTAP/TDAP/TD IMMUNIZATION (3 - Td) 2022     DEXA  Completed     HEPATITIS C SCREENING  Completed     PNEUMOCOCCAL IMMUNIZATION 65+ HIGH/HIGHEST RISK  Completed     IPV IMMUNIZATION  Aged Out     MENINGITIS IMMUNIZATION  Aged Out           Review of Systems      OBJECTIVE:   There were no vitals taken for this visit. Estimated body mass index is 28.12 kg/m  as calculated from the following:    Height as of 8/3/17: 1.651 m (5' 5\").    Weight as of 18: 76.7 kg (169 lb).  Physical Exam          ASSESSMENT / PLAN:       End of Life Planning:  Patient currently has an advanced directive: yes    COUNSELING:  Reviewed preventive health counseling, as reflected in patient instructions       Regular exercise       Healthy diet/nutrition    Estimated body mass index is 28.12 kg/m  as calculated from the following:    Height as of 8/3/17: 1.651 m (5' 5\").    Weight as of 18: 76.7 kg (169 lb).         reports that she quit smoking about 14 years ago. She quit after " 20.00 years of use. She has never used smokeless tobacco.      Appropriate preventive services were discussed with this patient, including applicable screening as appropriate for cardiovascular disease, diabetes, osteopenia/osteoporosis, and glaucoma.  As appropriate for age/gender, discussed screening for colorectal cancer, prostate cancer, breast cancer, and cervical cancer. Checklist reviewing preventive services available has been given to the patient.    Reviewed patients plan of care and provided an AVS. The Basic Care Plan (routine screening as documented in Health Maintenance) for Suni meets the Care Plan requirement. This Care Plan has been established and reviewed with the Patient.    Counseling Resources:  ATP IV Guidelines  Pooled Cohorts Equation Calculator  Breast Cancer Risk Calculator  FRAX Risk Assessment  ICSI Preventive Guidelines  Dietary Guidelines for Americans, 2010  [employer]'s MyPlate  ASA Prophylaxis  Lung CA Screening    Akil Pal MD  [facility]    Identified Health Risks:

## 2019-10-29 NOTE — PATIENT INSTRUCTIONS
Get the shingrix shot    For the carpal tunnel I would see Dr. Aminah Veronica    I will have the heart clinic call you to do the stress test    Akil Pal M.D.

## 2019-10-30 NOTE — RESULT ENCOUNTER NOTE
It was a pleasure seeing you for your physical examination.  I wanted to get back to you with your test results.  I have enclosed a copy for your review.     I am happy to report that your cbc or complete blood count is normal with no signs of anemia, leukemia or platelet abnormalities. Your chemistry panel shows no signs of diabetes.  Your blood salts, kidney tests, liver tests, thyroid test, and proteins are all fine.    Your total cholesterol is 201 with the normal range being below 200.  Your HDL or good cholesterol is 70 with the normal range being above 50.  Your LDL or bad cholesterol is 114 with the normal range being below 130.  Given the good amount of hdl the numbers are fine.    I am happy to bring you this overall excellent report.  Please get the stress test and monitor your blood pressure.  If you have any questions please call me.

## 2019-11-11 ENCOUNTER — HOSPITAL ENCOUNTER (OUTPATIENT)
Facility: CLINIC | Age: 69
End: 2019-11-11
Attending: INTERNAL MEDICINE | Admitting: INTERNAL MEDICINE
Payer: MEDICARE

## 2019-11-14 ENCOUNTER — HOSPITAL ENCOUNTER (OUTPATIENT)
Dept: CARDIOLOGY | Facility: CLINIC | Age: 69
Discharge: HOME | End: 2019-11-14
Attending: INTERNAL MEDICINE | Admitting: INTERNAL MEDICINE
Payer: MEDICARE

## 2019-11-14 DIAGNOSIS — R00.2 PALPITATIONS: ICD-10-CM

## 2019-11-14 PROCEDURE — 93018 CV STRESS TEST I&R ONLY: CPT | Performed by: INTERNAL MEDICINE

## 2019-11-14 PROCEDURE — 93350 STRESS TTE ONLY: CPT | Mod: 26 | Performed by: INTERNAL MEDICINE

## 2019-11-14 PROCEDURE — 93321 DOPPLER ECHO F-UP/LMTD STD: CPT | Mod: 26 | Performed by: INTERNAL MEDICINE

## 2019-11-14 PROCEDURE — 93016 CV STRESS TEST SUPVJ ONLY: CPT | Performed by: INTERNAL MEDICINE

## 2019-11-14 PROCEDURE — 93325 DOPPLER ECHO COLOR FLOW MAPG: CPT | Mod: 26 | Performed by: INTERNAL MEDICINE

## 2019-11-14 PROCEDURE — 25500064 ZZH RX 255 OP 636: Performed by: INTERNAL MEDICINE

## 2019-11-14 PROCEDURE — 40000264 ECHO STRESS ECHOCARDIOGRAM

## 2019-11-14 RX ADMIN — HUMAN ALBUMIN MICROSPHERES AND PERFLUTREN 3 ML: 10; .22 INJECTION, SOLUTION INTRAVENOUS at 09:34

## 2019-11-19 ENCOUNTER — OFFICE VISIT (OUTPATIENT)
Dept: CARDIOLOGY | Facility: CLINIC | Age: 69
End: 2019-11-19
Payer: MEDICARE

## 2019-11-19 VITALS
HEIGHT: 64 IN | HEART RATE: 64 BPM | BODY MASS INDEX: 28.49 KG/M2 | SYSTOLIC BLOOD PRESSURE: 126 MMHG | WEIGHT: 166.9 LBS | DIASTOLIC BLOOD PRESSURE: 88 MMHG

## 2019-11-19 DIAGNOSIS — R94.39 ABNORMAL CARDIOVASCULAR STRESS TEST: ICD-10-CM

## 2019-11-19 DIAGNOSIS — Z13.6 SCREENING FOR CARDIOVASCULAR CONDITION: Primary | ICD-10-CM

## 2019-11-19 PROCEDURE — 99205 OFFICE O/P NEW HI 60 MIN: CPT | Performed by: INTERNAL MEDICINE

## 2019-11-19 PROCEDURE — 93000 ELECTROCARDIOGRAM COMPLETE: CPT | Performed by: INTERNAL MEDICINE

## 2019-11-19 RX ORDER — ROSUVASTATIN CALCIUM 20 MG/1
20 TABLET, COATED ORAL DAILY
Qty: 90 TABLET | Refills: 3 | Status: SHIPPED | OUTPATIENT
Start: 2019-11-19 | End: 2020-10-05

## 2019-11-19 RX ORDER — METOPROLOL SUCCINATE 25 MG/1
25 TABLET, EXTENDED RELEASE ORAL DAILY
Qty: 90 TABLET | Refills: 3 | Status: SHIPPED | OUTPATIENT
Start: 2019-11-19 | End: 2019-12-16

## 2019-11-19 ASSESSMENT — MIFFLIN-ST. JEOR: SCORE: 1267.05

## 2019-11-19 NOTE — PROGRESS NOTES
Cardiology Clinic Consultation:    November 19, 2019   Patient Name: Suni Avila  Patient MRN: [id number]    Consult reason: abnormal stress echocardiogram    HPI and Assessment and Plan/Recommendations:    Please see dictation. #[id number]      Thank you for allowing our team to participate in the care of Suni Avila.  Please do not hesitate to call or page me with any questions or concerns.    Sincerely,     Clayton Silver MD  Cardiology  Pager:  [phone]  Text Page   November 19, 2019    cc  No referring provider defined for this encounter.    Past Medical History:   Past Medical History:   Diagnosis Date     Colonic polyp March 2012    [place], tubular adenoma, fu 2017     DCIS (ductal carcinoma in situ) of breast 2004    had xrt     History of breast cancer 2007    left, bilat mast and reconstruction, Dr. Cuevas     Screening 2003, 2014    dexa nl at gyn, dexa nl 10/14       Past Surgical History:   Past Surgical History:   Procedure Laterality Date     APPENDECTOMY  2002     ARTHROPLASTY KNEE UNICOMPARTMENT  9/13/2012    Procedure: ARTHROPLASTY KNEE UNICOMPARTMENT;  RIGHT KNEE UNICOMPARTMENTAL ARTHROPLASTY ;  Surgeon: Dakota Haines MD;  Location:  OR     ARTHROPLASTY KNEE UNICOMPARTMENT Left 5/24/2016    Procedure: ARTHROPLASTY KNEE UNICOMPARTMENT;  Surgeon: Dakota Haines MD;  Location:  OR     BREAST RECONSTRUCTION WITH DEEP INFERIOR EPIGASTRIC  (ABRAHAM) FLAP,  2009     bunion surgery  1975 and 2007     BUNIONECTOMY IVETH  5/9/2012    Procedure:BUNIONECTOMY IVETH; RIGHT HALLUX VALGUS WITH SECOND CLAWTOE RECONSTRUCTION; Surgeon:PHONG HENDRICKSON; Location: OR     EXCISE LESION TRUNK N/A 11/13/2015    Procedure: EXCISE LESION TRUNK;  Surgeon: Jose Luis Hawkins MD;  Location:  [facility]     FOOT SURGERY  08/2017    at [facility]     fractured arm  2009     MASTECTOMY BILATERAL, INSERT TISSUE EXPANDER BILATERAL, COMBINED  2008    breast cancer     RECONSTRUCT BREAST Left 11/13/2015     "Procedure: RECONSTRUCT BREAST;  Surgeon: Jose Luis Hawkins MD;  Location: [facility]     REVISE RECONSTRUCTED BREAST  1/27/2012    Procedure:REVISE RECONSTRUCTED BREAST; LEFT BREAST CAPSULORRAPHY; Surgeon:JOSE LUIS HAWKINS; Location:[facility]     TOE SURGERY  2014       Medications (outpatient):  Current Outpatient Medications   Medication Sig Dispense Refill     aspirin (ASA) 81 MG tablet Take 1 tablet (81 mg) by mouth daily       metoprolol succinate ER (TOPROL-XL) 25 MG 24 hr tablet Take 1 tablet (25 mg) by mouth daily 90 tablet 3     Multiple Vitamins-Minerals (MULTIVITAMIN GUMMIES ADULTS) CHEW Take 2 chew tab by mouth daily       rosuvastatin (CRESTOR) 20 MG tablet Take 1 tablet (20 mg) by mouth daily 90 tablet 3       Allergies:  Allergies   Allergen Reactions     No Known Drug Allergy      Latex Rash       Social History:   History   Drug Use No      History   Smoking Status     Former Smoker     Years: 20.00     Quit date: 1/21/2005   Smokeless Tobacco     Never Used     Social History    Substance and Sexual Activity      Alcohol use: Yes        Alcohol/week: 0.0 standard drinks        Comment: 3-4 drinks monthly       Family History:  Family History   Problem Relation Age of Onset     Hypertension Father        Review of Systems:   A complete review of systems was negative except as mentioned in the History of Present Illness.     Objective & Physical Exam:  /88 (BP Location: Right arm, Patient Position: Sitting, Cuff Size: Adult Regular)   Pulse 64   Ht 1.626 m (5' 4\")   Wt 75.7 kg (166 lb 14.4 oz)   Breastfeeding No   BMI 28.65 kg/m    Wt Readings from Last 2 Encounters:   11/19/19 75.7 kg (166 lb 14.4 oz)   10/29/19 75.8 kg (167 lb)     Body mass index is 28.65 kg/m .   Body surface area is 1.85 meters squared.    Constitutional: appears stated age, in no apparent distress, appears to be well nourished  Eyes: sclera anicteric, conjunctiva normal, no lesions on eyelids or lashes  ENT: " normocephalic, without obvious abnormality, atraumatic, external ears without lesions   Pulmonary: clear to auscultation bilaterally, no wheezes, no rales, no increased work of breathing  Cardiovascular: JVP normal, regular rate, regular rhythm, normal S1 and S2, no S3, S4, 1/6 VIPUL at the RUSB, no lower extremity edema  Gastrointestinal: abdominal exam benign, non-tender, no rigidity, no guarding  Neurologic: awake, alert, face symmetrical, moves all extremities  Skin: no abnormal rashes or lesions on limited exam, nails normal without discoloration or clubbing, no jaundice  Psychiatric: affect is normal, answers questions appropriately, oriented to self and place    Labs reviewed:  Lab Results   Component Value Date    WBC 5.9 10/29/2019    RBC 4.50 10/29/2019    HGB 13.4 10/29/2019    HCT 40.7 10/29/2019    MCV 90 10/29/2019    MCH 29.8 10/29/2019    MCHC 32.9 10/29/2019    RDW 13.5 10/29/2019     10/29/2019     Sodium   Date Value Ref Range Status   10/29/2019 139 133 - 144 mmol/L Final     Potassium   Date Value Ref Range Status   10/29/2019 4.3 3.4 - 5.3 mmol/L Final     Chloride   Date Value Ref Range Status   10/29/2019 109 94 - 109 mmol/L Final     Carbon Dioxide   Date Value Ref Range Status   10/29/2019 25 20 - 32 mmol/L Final     Anion Gap   Date Value Ref Range Status   10/29/2019 5 3 - 14 mmol/L Final     Glucose   Date Value Ref Range Status   10/29/2019 91 70 - 99 mg/dL Final     Urea Nitrogen   Date Value Ref Range Status   10/29/2019 17 7 - 30 mg/dL Final     Creatinine   Date Value Ref Range Status   10/29/2019 0.66 0.52 - 1.04 mg/dL Final     GFR Estimate   Date Value Ref Range Status   10/29/2019 90 >60 mL/min/[1.73_m2] Final     Comment:     Non  GFR Calc  Starting 12/18/2018, serum creatinine based estimated GFR (eGFR) will be   calculated using the Chronic Kidney Disease Epidemiology Collaboration   (CKD-EPI) equation.       Calcium   Date Value Ref Range Status    10/29/2019 8.5 8.5 - 10.1 mg/dL Final     Bilirubin Total   Date Value Ref Range Status   10/29/2019 0.5 0.2 - 1.3 mg/dL Final     Alkaline Phosphatase   Date Value Ref Range Status   10/29/2019 141 40 - 150 U/L Final     ALT   Date Value Ref Range Status   10/29/2019 30 0 - 50 U/L Final     AST   Date Value Ref Range Status   10/29/2019 20 0 - 45 U/L Final     Recent Labs   Lab Test 10/29/19  1011 16  1327   CHOL 201* 182   HDL 70 73   * 76   TRIG 85 166*      No results found for: A1C     Prior select studies:  Recent Results (from the past 4320 hour(s))   Echocardiogram Exercise Stress    Narrative    [id number]  [id number]  [id number]  [id number]^DOMITILA^AKIL^KARO           [facility]  Echocardiography Laboratory  [address] [place] [address]        Name: KELSIE PURVIS  MRN: [id number]  : 1950  Study Date: 2019 09:13 AM  Age: 69 yrs  Gender: Female  Patient [facility]: [facility]  Reason For Study: Palpitations  History: Palpitations  Ordering Physician: AKIL RAMESH  Referring Physician: AKIL RAMESH  Performed By: Akil Underwood RDCS     BSA: 1.8 m2  Height: 64 in  Weight: 167 lb  HR: 75  BP: 124/82 mmHg  _____________________________________________________________________________  __        Procedure  Stress Echo Complete. Contrast Optison.  _____________________________________________________________________________  __        Interpretation Summary     Abnormal stress echocardiogram, positive for evidence of myocardial ischemia.     Target heart rate achieved.  Poor functional capacity, patient was only able to exercise for 3 minutes.  Patient did not have any symptoms during the study.  ECG rest showed NSR with incomplete RBBB.  ECG stress was negative for ST changes. Rare PVC in recovery.  Rest normal LV function and wall motion, LVEF 60%.  Stress normal LV function, LVEF 60%, without appropriate decrease in chamber  size. Hypokinesis of the basal to  apical inferior, basal to mid inferolateral  wall segments.     On screening 2D echocardiography, normal LV and RV function, no significant  valvular abnormalities, just trace AI.     I discussed the results with the patient while she was in our stress lab, and  have made arrangements to have her be seen in Cardiology clinic at the next  available appoitnment for follow up with either me, or another physician.  _____________________________________________________________________________  __     Stress  The patient exercised 3:00.  RPP [id number].  There was a normal BP response to exercise.  Exercise was stopped due to fatigue.  The patient exhibited no chest pain during exercise.  Target Heart Rate was achieved.  The Duke treadmill score was intermediate risk ( -11< Duke score <5).  This was a normal stress EKG with no evidence of stress-induced ischemia.  Rare PVCs in recovery.  This was an abnormal stress echocardiogram with an intermediate risk.  The resting phase of the study was normal.  Normal rest wall motion with stress-induced wall motion abnormalities  consistent with ischemia in the inferior and inferolateral wall(s).  There were stress-induced wall motion abnormalities observed.  The visual ejection fraction is estimated at 55-60%.     Baseline  The patient is in normal sinus rhythm.  incomplete RBBB.  Normal left ventricular function and wall motion at rest.     Stress Results             Protocol:  Denis          Maximum Predicted HR:   151 bpm             Target HR: 128 bpm        % Maximum Predicted HR: 100 %          Stage  DurationHeart Rate  BP                  Comment              (mm:ss)   (bpm)      Stage 1   3:00     151    160/84     RecoveryR  6:00      85    130/78Duke Treadmill Score: 3 (Moderate Risk)               Stress Duration:   3:00 mm:ss *        Recovery Time: 6:00 mm:ss         Maximum Stress HR: 151 bpm *           METS:          5      _____________________________________________________________________________  __  MMode/2D Measurements & Calculations  asc Aorta Diam: 4.1 cm        Doppler Measurements & Calculations  TR max carolynn: 243.5 cm/sec  TR max P.7 mmHg              _____________________________________________________________________________  __        Report approved by: Umesh Escamilla 2019 10:10 AM

## 2019-11-19 NOTE — LETTER
11/19/2019    Akil Pal MD  [address]  [place] [address]    RE: Suni Avila       Dear Colleague,    I had the pleasure of seeing Suni Avila in the [facility].        Cardiology Clinic Consultation:    November 19, 2019   Patient Name: Suni Avila  Patient MRN: [id number]    Consult reason: abnormal stress echocardiogram    HPI and Assessment and Plan/Recommendations:    Please see dictation. #[id number]      Thank you for allowing our team to participate in the care of Suni Avila.  Please do not hesitate to call or page me with any questions or concerns.    Sincerely,     Clayton Silver MD  Cardiology  Pager:  [phone]  Text Page   November 19, 2019    cc  No referring provider defined for this encounter.    Past Medical History:   Past Medical History:   Diagnosis Date     Colonic polyp March 2012    [place], tubular adenoma, fu 2017     DCIS (ductal carcinoma in situ) of breast 2004    had xrt     History of breast cancer 2007    left, bilat mast and reconstruction, Dr. Cuevas     Screening 2003, 2014    dexa nl at gyn, dexa nl 10/14       Past Surgical History:   Past Surgical History:   Procedure Laterality Date     APPENDECTOMY  2002     ARTHROPLASTY KNEE UNICOMPARTMENT  9/13/2012    Procedure: ARTHROPLASTY KNEE UNICOMPARTMENT;  RIGHT KNEE UNICOMPARTMENTAL ARTHROPLASTY ;  Surgeon: Dakota Haines MD;  Location:  OR     ARTHROPLASTY KNEE UNICOMPARTMENT Left 5/24/2016    Procedure: ARTHROPLASTY KNEE UNICOMPARTMENT;  Surgeon: Dakota Haines MD;  Location: [facility] OR     BREAST RECONSTRUCTION WITH DEEP INFERIOR EPIGASTRIC  (ABRAHAM) FLAP,  2009     bunion surgery  1975 and 2007     BUNIONECTOMY IVETH  5/9/2012    Procedure:BUNIONECTOMY IVETH; RIGHT HALLUX VALGUS WITH SECOND CLAWTOE RECONSTRUCTION; Surgeon:PHONG HENDRICKSON; Location:[facility] OR     EXCISE LESION TRUNK N/A 11/13/2015    Procedure: EXCISE LESION TRUNK;  Surgeon: Jose Luis Hawkins  "MD Vincenzo;  Location: [facility]     FOOT SURGERY  08/2017    at [facility]     fractured arm  2009     MASTECTOMY BILATERAL, INSERT TISSUE EXPANDER BILATERAL, COMBINED  2008    breast cancer     RECONSTRUCT BREAST Left 11/13/2015    Procedure: RECONSTRUCT BREAST;  Surgeon: Jose Luis Hawkins MD;  Location: [facility]     REVISE RECONSTRUCTED BREAST  1/27/2012    Procedure:REVISE RECONSTRUCTED BREAST; LEFT BREAST CAPSULORRAPHY; Surgeon:JOSE LUIS HAWKINS; Location:[facility]     TOE SURGERY  2014       Medications (outpatient):  Current Outpatient Medications   Medication Sig Dispense Refill     aspirin (ASA) 81 MG tablet Take 1 tablet (81 mg) by mouth daily       metoprolol succinate ER (TOPROL-XL) 25 MG 24 hr tablet Take 1 tablet (25 mg) by mouth daily 90 tablet 3     Multiple Vitamins-Minerals (MULTIVITAMIN GUMMIES ADULTS) CHEW Take 2 chew tab by mouth daily       rosuvastatin (CRESTOR) 20 MG tablet Take 1 tablet (20 mg) by mouth daily 90 tablet 3       Allergies:  Allergies   Allergen Reactions     No Known Drug Allergy      Latex Rash       Social History:   History   Drug Use No      History   Smoking Status     Former Smoker     Years: 20.00     Quit date: 1/21/2005   Smokeless Tobacco     Never Used     Social History    Substance and Sexual Activity      Alcohol use: Yes        Alcohol/week: 0.0 standard drinks        Comment: 3-4 drinks monthly       Family History:  Family History   Problem Relation Age of Onset     Hypertension Father        Review of Systems:   A complete review of systems was negative except as mentioned in the History of Present Illness.     Objective & Physical Exam:  /88 (BP Location: Right arm, Patient Position: Sitting, Cuff Size: Adult Regular)   Pulse 64   Ht 1.626 m (5' 4\")   Wt 75.7 kg (166 lb 14.4 oz)   Breastfeeding No   BMI 28.65 kg/m     Wt Readings from Last 2 Encounters:   11/19/19 75.7 kg (166 lb 14.4 oz)   10/29/19 75.8 kg (167 lb)     Body mass index is 28.65 " kg/m .   Body surface area is 1.85 meters squared.    Constitutional: appears stated age, in no apparent distress, appears to be well nourished  Eyes: sclera anicteric, conjunctiva normal, no lesions on eyelids or lashes  ENT: normocephalic, without obvious abnormality, atraumatic, external ears without lesions   Pulmonary: clear to auscultation bilaterally, no wheezes, no rales, no increased work of breathing  Cardiovascular: JVP normal, regular rate, regular rhythm, normal S1 and S2, no S3, S4, 1/6 VIPUL at the RUSB, no lower extremity edema  Gastrointestinal: abdominal exam benign, non-tender, no rigidity, no guarding  Neurologic: awake, alert, face symmetrical, moves all extremities  Skin: no abnormal rashes or lesions on limited exam, nails normal without discoloration or clubbing, no jaundice  Psychiatric: affect is normal, answers questions appropriately, oriented to self and place    Labs reviewed:  Lab Results   Component Value Date    WBC 5.9 10/29/2019    RBC 4.50 10/29/2019    HGB 13.4 10/29/2019    HCT 40.7 10/29/2019    MCV 90 10/29/2019    MCH 29.8 10/29/2019    MCHC 32.9 10/29/2019    RDW 13.5 10/29/2019     10/29/2019     Sodium   Date Value Ref Range Status   10/29/2019 139 133 - 144 mmol/L Final     Potassium   Date Value Ref Range Status   10/29/2019 4.3 3.4 - 5.3 mmol/L Final     Chloride   Date Value Ref Range Status   10/29/2019 109 94 - 109 mmol/L Final     Carbon Dioxide   Date Value Ref Range Status   10/29/2019 25 20 - 32 mmol/L Final     Anion Gap   Date Value Ref Range Status   10/29/2019 5 3 - 14 mmol/L Final     Glucose   Date Value Ref Range Status   10/29/2019 91 70 - 99 mg/dL Final     Urea Nitrogen   Date Value Ref Range Status   10/29/2019 17 7 - 30 mg/dL Final     Creatinine   Date Value Ref Range Status   10/29/2019 0.66 0.52 - 1.04 mg/dL Final     GFR Estimate   Date Value Ref Range Status   10/29/2019 90 >60 mL/min/[1.73_m2] Final     Comment:     Non  GFR  Calc  Starting 2018, serum creatinine based estimated GFR (eGFR) will be   calculated using the Chronic Kidney Disease Epidemiology Collaboration   (CKD-EPI) equation.       Calcium   Date Value Ref Range Status   10/29/2019 8.5 8.5 - 10.1 mg/dL Final     Bilirubin Total   Date Value Ref Range Status   10/29/2019 0.5 0.2 - 1.3 mg/dL Final     Alkaline Phosphatase   Date Value Ref Range Status   10/29/2019 141 40 - 150 U/L Final     ALT   Date Value Ref Range Status   10/29/2019 30 0 - 50 U/L Final     AST   Date Value Ref Range Status   10/29/2019 20 0 - 45 U/L Final     Recent Labs   Lab Test 10/29/19  1011 16  1327   CHOL 201* 182   HDL 70 73   * 76   TRIG 85 166*      No results found for: A1C     Prior select studies:  Recent Results (from the past 4320 hour(s))   Echocardiogram Exercise Stress    Narrative    [id number]  [id number]  [id number]  [id number]^DOMITILA^AKIL^KARO           [facility]  Echocardiography Laboratory  [address] [place] [address]        Name: KELSIE PURVIS  MRN: [id number]  : 1950  Study Date: 2019 09:13 AM  Age: 69 yrs  Gender: Female  Patient Location: [facility]  Reason For Study: Palpitations  History: Palpitations  Ordering Physician: AKIL RAMESH  Referring Physician: AKIL RAMESH  Performed By: Akil Underwood RDCS     BSA: 1.8 m2  Height: 64 in  Weight: 167 lb  HR: 75  BP: 124/82 mmHg  _____________________________________________________________________________  __        Procedure  Stress Echo Complete. Contrast Optison.  _____________________________________________________________________________  __        Interpretation Summary     Abnormal stress echocardiogram, positive for evidence of myocardial ischemia.     Target heart rate achieved.  Poor functional capacity, patient was only able to exercise for 3 minutes.  Patient did not have any symptoms during the study.  ECG rest showed NSR with incomplete RBBB.  ECG  stress was negative for ST changes. Rare PVC in recovery.  Rest normal LV function and wall motion, LVEF 60%.  Stress normal LV function, LVEF 60%, without appropriate decrease in chamber  size. Hypokinesis of the basal to apical inferior, basal to mid inferolateral  wall segments.     On screening 2D echocardiography, normal LV and RV function, no significant  valvular abnormalities, just trace AI.     I discussed the results with the patient while she was in our stress lab, and  have made arrangements [facility] with either me, or another physician.  _____________________________________________________________________________  __     Stress  The patient exercised 3:00.  RPP 96245.  There was a normal BP response to exercise.  Exercise was stopped due to fatigue.  The patient exhibited no chest pain during exercise.  Target Heart Rate was achieved.  The Duke treadmill score was intermediate risk ( -11< Duke score <5).  This was a normal stress EKG with no evidence of stress-induced ischemia.  Rare PVCs in recovery.  This was an abnormal stress echocardiogram with an intermediate risk.  The resting phase of the study was normal.  Normal rest wall motion with stress-induced wall motion abnormalities  consistent with ischemia in the inferior and inferolateral wall(s).  There were stress-induced wall motion abnormalities observed.  The visual ejection fraction is estimated at 55-60%.     Baseline  The patient is in normal sinus rhythm.  incomplete RBBB.  Normal left ventricular function and wall motion at rest.     Stress Results             Protocol:  Denis          Maximum Predicted HR:   151 bpm             Target HR: 128 bpm        % Maximum Predicted HR: 100 %          Stage  DurationHeart Rate  BP                  Comment              (mm:ss)   (bpm)      Stage 1   3:00     151    160/84     RecoveryR  6:00      85    130/78Duke  Treadmill Score: 3 (Moderate Risk)               Stress Duration:   3:00 mm:ss *        Recovery Time: 6:00 mm:ss         Maximum Stress HR: 151 bpm *           METS:          5     _____________________________________________________________________________  __  MMode/2D Measurements & Calculations  asc Aorta Diam: 4.1 cm        Doppler Measurements & Calculations  TR max carolynn: 243.5 cm/sec  TR max P.7 mmHg              _____________________________________________________________________________  __        Report approved by: Umesh Escamilla 2019 10:10 AM          Service Date: 2019      CARDIOLOGY CLINIC CONSULTATION      HISTORY OF PRESENT ILLNESS:      I had the opportunity to see the patient, Suni Avila, today in Cardiology Clinic for consultation.  As you know, she is a 69-year-old female with a past medical history significant for former smoking (20 pack years), breast cancer status post radiation and mastectomy, hyperlipidemia who presents for further evaluation and management after she had an abnormal stress echocardiogram.      Over the past several months she has had worsening shortness of breath with exertion.  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  She denies any chest pain or chest pressure during these episodes; however, notes just abnormal shortness of breath.  She also walks the dog regularly, and as part of her walking routine, they traverse a large hill.  Whereas before, particularly in the summer, she had no difficulty climbing up this hill with the dog, over the past several weeks she has had worsening shortness of breath.  This is odd to here since she remarks that with her job requiring her to climb several flights of stairs daily she should be in better shape than she feels like she is.      She also reports occasional episodes of palpitations  described as a prominent pounding sensation of her heart.  These episodes are not associated with activity and occur sporadically and are relatively self limiting, lasting only for several seconds before spontaneously ceasing.      As part of the evaluation for this, she underwent an exercise stress echocardiogram done on 11/14/2019.  This stress echocardiogram was abnormal and showed normal biventricular function at rest.  However, with stress there was hypokinesis of the basal to apical inferior and basal to mid inferolateral wall segments.  I actually read this stress echocardiogram, and following the study, I was able to meet with her in the stress echo lab and arranged to have her see the next available physician.  Apparently, that was me.    Currently, she is chest pain free.  Denies any palpitations, dizziness, lightheadedness.  She denies any symptoms consistent with orthopnea or PND.      ASSESSMENT AND PLAN:      In summary, Suni Avila is a 69-year-old female with a past medical history significant for former smoking (20 pack years), hyperlipidemia, and breast cancer status post radiation therapy, who presents with several weeks of worsening dyspnea on exertion as well as an abnormal stress echocardiogram.       #  Worsening dyspnea on exertion.  Suspect this may be an anginal equivalent.  Abnormal stress echocardiogram 11/14/2019 notable for hypokinesis of the basal to apical inferior and basal to mid inferolateral wall segments.     # Cardiovascular disease risk factors.  Hyperlipidemia, former smoking, breast cancer status post radiation therapy.     The risks, benefits, and alternatives to cardiac catheterization, coronary angiography with possible intervention, moderate sedation, and possible blood transfusion were discussed at length. The patient was able to verbalize back the risks, benefits, and alternatives to the aforementioned procedures, and asked appropriate questions.      -- Start aspirin  81 mg daily.   -- Start rosuvastatin 20 mg daily.   -- Start metoprolol succinate 25 mg daily.   -- She leaves for [place] at the end of December and so we will arrange to have her undergo the coronary angiogram and possible PCI, as well as post-procedure follow up.      Thank you for allowing our team to participate in the care of Suni Avila.  Please do not hesitate to call or page me anytime with questions or concerns.      Clayton Silver MD  Cardiology  Pager:  [phone]  Text Page   2019             D: 2019   T: 2019   MT: berta      Name:     SUNI AVILA   MRN:      [id number]        Account:      [id number]   :      1950           Service Date: 2019      Document: [id number]        Thank you for allowing me to participate in the care of your patient.      Sincerely,     Clayton Silver MD     [facility]

## 2019-11-19 NOTE — PATIENT INSTRUCTIONS
November 19, 2019    Thank you for allowing our Cardiology team to participate in your care.     Please note the following changes to your heart treatment plan:     Medication changes:   - start aspirin 81mg daily  - start metoprolol succinate 25mg daily  - start rosuvastatin 20mg daily     Tests to be done:  - coronary angiography and possible stenting    Follow up:  - We will arrange for you to follow up in 1 month after the procedure, and before you leave for [place]     Please contact our team at [phone] for any questions or concerns.   If you are having a medical emergency, please call 911.       Sincerely,    Clayton Silver MD  Cardiology - [facility]    [facility] and [facility]

## 2019-11-20 NOTE — PROGRESS NOTES
Service Date: 11/19/2019      CARDIOLOGY CLINIC CONSULTATION      HISTORY OF PRESENT ILLNESS:      I had the opportunity to see the patient, Suni Avila, today in Cardiology Clinic for consultation.  As you know, she is a 69-year-old female with a past medical history significant for former smoking (20 pack years), breast cancer status post radiation and mastectomy, hyperlipidemia who presents for further evaluation and management after she had an abnormal stress echocardiogram.      Over the past several months she has had worsening shortness of breath with exertion.  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  She denies any chest pain or chest pressure during these episodes; however, notes just abnormal shortness of breath.  She also walks the dog regularly, and as part of her walking routine, they traverse a large hill.  Whereas before, particularly in the summer, she had no difficulty climbing up this hill with the dog, over the past several weeks she has had worsening shortness of breath.  This is odd to here since she remarks that with her job requiring her to climb several flights of stairs daily she should be in better shape than she feels like she is.      She also reports occasional episodes of palpitations described as a prominent pounding sensation of her heart.  These episodes are not associated with activity and occur sporadically and are relatively self limiting, lasting only for several seconds before spontaneously ceasing.      As part of the evaluation for this, she underwent an exercise stress echocardiogram done on 11/14/2019.  This stress echocardiogram was abnormal and showed normal biventricular function at rest.  However, with stress there was hypokinesis of the basal to apical inferior and basal to mid inferolateral wall segments.  I actually read this stress  echocardiogram, and following the study, I was able to meet with her in the stress echo lab and arranged to have her see the next available physician.  Apparently, that was me.    Currently, she is chest pain free.  Denies any palpitations, dizziness, lightheadedness.  She denies any symptoms consistent with orthopnea or PND.      ASSESSMENT AND PLAN:      In summary, Suni Avila is a 69-year-old female with a past medical history significant for former smoking (20 pack years), hyperlipidemia, and breast cancer status post radiation therapy, who presents with several weeks of worsening dyspnea on exertion as well as an abnormal stress echocardiogram.       #  Worsening dyspnea on exertion.  Suspect this may be an anginal equivalent.  Abnormal stress echocardiogram 11/14/2019 notable for hypokinesis of the basal to apical inferior and basal to mid inferolateral wall segments.     # Cardiovascular disease risk factors.  Hyperlipidemia, former smoking, breast cancer status post radiation therapy.     The risks, benefits, and alternatives to cardiac catheterization, coronary angiography with possible intervention, moderate sedation, and possible blood transfusion were discussed at length. The patient was able to verbalize back the risks, benefits, and alternatives to the aforementioned procedures, and asked appropriate questions.      -- Start aspirin 81 mg daily.   -- Start rosuvastatin 20 mg daily.   -- Start metoprolol succinate 25 mg daily.   -- She leaves for [place] at the end of December and so we will arrange to have her undergo the coronary angiogram and possible PCI, as well as post-procedure follow up.      Thank you for allowing our team to participate in the care of Suni Avila.  Please do not hesitate to call or page me anytime with questions or concerns.      Clayton Silver MD  Cardiology  Pager:  [phone]  Text Page   November 20, 2019             D: 11/19/2019   T: 11/20/2019   MT: berta      Name:      KELSIE PURVIS   MRN:      [id number]        Account:      [id number]   :      1950           Service Date: 2019      Document: [id number]

## 2019-11-26 DIAGNOSIS — R94.39 ABNORMAL CARDIOVASCULAR STRESS TEST: Primary | ICD-10-CM

## 2019-11-26 DIAGNOSIS — I99.8 OTHER DISORDER OF CIRCULATORY SYSTEM: ICD-10-CM

## 2019-11-26 RX ORDER — POTASSIUM CHLORIDE 750 MG/1
20 TABLET, EXTENDED RELEASE ORAL
Status: CANCELLED | OUTPATIENT
Start: 2019-11-26

## 2019-11-26 RX ORDER — SODIUM CHLORIDE 9 MG/ML
INJECTION, SOLUTION INTRAVENOUS CONTINUOUS
Status: CANCELLED | OUTPATIENT
Start: 2019-11-26

## 2019-11-26 RX ORDER — ASPIRIN 81 MG/1
81 TABLET ORAL DAILY
Status: CANCELLED | OUTPATIENT
Start: 2019-11-26 | End: 2019-11-28

## 2019-11-26 RX ORDER — LIDOCAINE 40 MG/G
CREAM TOPICAL
Status: CANCELLED | OUTPATIENT
Start: 2019-11-26

## 2019-12-03 ENCOUNTER — TELEPHONE (OUTPATIENT)
Dept: CARDIOLOGY | Facility: CLINIC | Age: 69
End: 2019-12-03

## 2019-12-03 DIAGNOSIS — I99.8 OTHER DISORDER OF CIRCULATORY SYSTEM: ICD-10-CM

## 2019-12-03 DIAGNOSIS — R94.39 ABNORMAL CARDIOVASCULAR STRESS TEST: ICD-10-CM

## 2019-12-03 LAB
ANION GAP SERPL CALCULATED.3IONS-SCNC: 7 MMOL/L (ref 3–14)
APTT PPP: 30 SEC (ref 22–37)
BUN SERPL-MCNC: 15 MG/DL (ref 7–30)
CALCIUM SERPL-MCNC: 8.9 MG/DL (ref 8.5–10.1)
CHLORIDE SERPL-SCNC: 111 MMOL/L (ref 94–109)
CO2 SERPL-SCNC: 23 MMOL/L (ref 20–32)
CREAT SERPL-MCNC: 0.62 MG/DL (ref 0.52–1.04)
ERYTHROCYTE [DISTWIDTH] IN BLOOD BY AUTOMATED COUNT: 12.7 % (ref 10–15)
GFR SERPL CREATININE-BSD FRML MDRD: >90 ML/MIN/{1.73_M2}
GLUCOSE SERPL-MCNC: 106 MG/DL (ref 70–99)
HCT VFR BLD AUTO: 43 % (ref 35–47)
HGB BLD-MCNC: 13.6 G/DL (ref 11.7–15.7)
INR PPP: 0.96 (ref 0.86–1.14)
MCH RBC QN AUTO: 28.6 PG (ref 26.5–33)
MCHC RBC AUTO-ENTMCNC: 31.6 G/DL (ref 31.5–36.5)
MCV RBC AUTO: 91 FL (ref 78–100)
PLATELET # BLD AUTO: 255 10E9/L (ref 150–450)
POTASSIUM SERPL-SCNC: 3.9 MMOL/L (ref 3.4–5.3)
RBC # BLD AUTO: 4.75 10E12/L (ref 3.8–5.2)
SODIUM SERPL-SCNC: 141 MMOL/L (ref 133–144)
WBC # BLD AUTO: 6.9 10E9/L (ref 4–11)

## 2019-12-03 PROCEDURE — 36415 COLL VENOUS BLD VENIPUNCTURE: CPT | Performed by: INTERNAL MEDICINE

## 2019-12-03 PROCEDURE — 80048 BASIC METABOLIC PNL TOTAL CA: CPT | Performed by: INTERNAL MEDICINE

## 2019-12-03 PROCEDURE — 85610 PROTHROMBIN TIME: CPT | Performed by: INTERNAL MEDICINE

## 2019-12-03 PROCEDURE — 85027 COMPLETE CBC AUTOMATED: CPT | Performed by: INTERNAL MEDICINE

## 2019-12-03 PROCEDURE — 85730 THROMBOPLASTIN TIME PARTIAL: CPT | Performed by: INTERNAL MEDICINE

## 2019-12-03 NOTE — TELEPHONE ENCOUNTER
Called patient to review coronary angiogram/cath prep instructions.     Patient is scheduled for a Left heart cath/coronary angiogram at location [facility], on 2/6/19.  Check in time is at 10:00 AM and procedure time is at 12:00 PM.    Advised patient not eat or drink after midnight on 12/5/19 .      Patient is not taking a diuretic.     Patient is not taking insulin.     Patient is not taking Metformin or other diabetic medications.     Patient is not taking an anticoagulant.     Patient should continue their current dose of Aspirin with their other daily medications the morning of the procedure with small sips of water.     Verified patient does NOT have an allergy to contrast dye.     Verified patient has a responsible adult to drive them home from the hospital and stay with them for 24 hours after the procedure.     Confirmed follow-up appointment scheduled 12/16/19 date at 10:10 AM time with Ryan Rodriguez CNP provider at the [facility].     Patient had no questions about their prep instructions.     Cath orders signed and held for day of the procedure.     Silvia MONIQUE  [facility]   12/03/19 10:06 AM

## 2019-12-06 ENCOUNTER — TELEPHONE (OUTPATIENT)
Facility: CLINIC | Age: 69
End: 2019-12-06

## 2019-12-06 ENCOUNTER — HOSPITAL ENCOUNTER (OUTPATIENT)
Facility: CLINIC | Age: 69
Discharge: HOME | End: 2019-12-06
Admitting: INTERNAL MEDICINE
Payer: MEDICARE

## 2019-12-06 ENCOUNTER — SURGERY (OUTPATIENT)
Age: 69
End: 2019-12-06
Payer: MEDICARE

## 2019-12-06 VITALS
OXYGEN SATURATION: 97 % | HEART RATE: 60 BPM | SYSTOLIC BLOOD PRESSURE: 120 MMHG | HEIGHT: 64 IN | WEIGHT: 165 LBS | TEMPERATURE: 96.8 F | BODY MASS INDEX: 28.17 KG/M2 | DIASTOLIC BLOOD PRESSURE: 46 MMHG | RESPIRATION RATE: 16 BRPM

## 2019-12-06 DIAGNOSIS — R94.39 ABNORMAL CARDIOVASCULAR STRESS TEST: ICD-10-CM

## 2019-12-06 PROCEDURE — 40000065 ZZH STATISTIC EKG NON-CHARGEABLE

## 2019-12-06 PROCEDURE — 25000125 ZZHC RX 250: Performed by: INTERNAL MEDICINE

## 2019-12-06 PROCEDURE — 99152 MOD SED SAME PHYS/QHP 5/>YRS: CPT | Performed by: INTERNAL MEDICINE

## 2019-12-06 PROCEDURE — 93454 CORONARY ARTERY ANGIO S&I: CPT | Mod: 26 | Performed by: INTERNAL MEDICINE

## 2019-12-06 PROCEDURE — 93010 ELECTROCARDIOGRAM REPORT: CPT | Performed by: INTERNAL MEDICINE

## 2019-12-06 PROCEDURE — 25000132 ZZH RX MED GY IP 250 OP 250 PS 637: Mod: GY | Performed by: INTERNAL MEDICINE

## 2019-12-06 PROCEDURE — 25000128 H RX IP 250 OP 636: Performed by: INTERNAL MEDICINE

## 2019-12-06 PROCEDURE — C1877 STENT, NON-COAT/COV W/O DEL: HCPCS | Performed by: INTERNAL MEDICINE

## 2019-12-06 PROCEDURE — 25800030 ZZH RX IP 258 OP 636: Performed by: INTERNAL MEDICINE

## 2019-12-06 PROCEDURE — 27210794 ZZH OR GENERAL SUPPLY STERILE: Performed by: INTERNAL MEDICINE

## 2019-12-06 PROCEDURE — C1769 GUIDE WIRE: HCPCS | Performed by: INTERNAL MEDICINE

## 2019-12-06 PROCEDURE — 40000275 ZZH STATISTIC RCP TIME EA 10 MIN

## 2019-12-06 PROCEDURE — 93454 CORONARY ARTERY ANGIO S&I: CPT | Performed by: INTERNAL MEDICINE

## 2019-12-06 PROCEDURE — 93005 ELECTROCARDIOGRAM TRACING: CPT

## 2019-12-06 PROCEDURE — C1894 INTRO/SHEATH, NON-LASER: HCPCS | Performed by: INTERNAL MEDICINE

## 2019-12-06 RX ORDER — FENTANYL CITRATE 50 UG/ML
INJECTION, SOLUTION INTRAMUSCULAR; INTRAVENOUS
Status: DISCONTINUED
Start: 2019-12-06 | End: 2019-12-06 | Stop reason: HOSPADM

## 2019-12-06 RX ORDER — VERAPAMIL HYDROCHLORIDE 2.5 MG/ML
INJECTION, SOLUTION INTRAVENOUS
Status: DISCONTINUED | OUTPATIENT
Start: 2019-12-06 | End: 2019-12-06 | Stop reason: HOSPADM

## 2019-12-06 RX ORDER — NALOXONE HYDROCHLORIDE 0.4 MG/ML
.2-.4 INJECTION, SOLUTION INTRAMUSCULAR; INTRAVENOUS; SUBCUTANEOUS
Status: DISCONTINUED | OUTPATIENT
Start: 2019-12-06 | End: 2019-12-06 | Stop reason: HOSPADM

## 2019-12-06 RX ORDER — SODIUM CHLORIDE 9 MG/ML
INJECTION, SOLUTION INTRAVENOUS CONTINUOUS
Status: DISCONTINUED | OUTPATIENT
Start: 2019-12-06 | End: 2019-12-06 | Stop reason: HOSPADM

## 2019-12-06 RX ORDER — NOREPINEPHRINE BITARTRATE 0.06 MG/ML
.03-.4 INJECTION, SOLUTION INTRAVENOUS CONTINUOUS PRN
Status: DISCONTINUED | OUTPATIENT
Start: 2019-12-06 | End: 2019-12-06 | Stop reason: HOSPADM

## 2019-12-06 RX ORDER — LIDOCAINE 40 MG/G
CREAM TOPICAL
Status: DISCONTINUED | OUTPATIENT
Start: 2019-12-06 | End: 2019-12-06 | Stop reason: HOSPADM

## 2019-12-06 RX ORDER — ASPIRIN 81 MG/1
81 TABLET ORAL DAILY
Status: DISCONTINUED | OUTPATIENT
Start: 2019-12-06 | End: 2019-12-06 | Stop reason: HOSPADM

## 2019-12-06 RX ORDER — ARGATROBAN 1 MG/ML
350 INJECTION, SOLUTION INTRAVENOUS
Status: DISCONTINUED | OUTPATIENT
Start: 2019-12-06 | End: 2019-12-06 | Stop reason: HOSPADM

## 2019-12-06 RX ORDER — EPTIFIBATIDE 2 MG/ML
180 INJECTION, SOLUTION INTRAVENOUS EVERY 10 MIN PRN
Status: DISCONTINUED | OUTPATIENT
Start: 2019-12-06 | End: 2019-12-06 | Stop reason: HOSPADM

## 2019-12-06 RX ORDER — FENTANYL CITRATE 50 UG/ML
25-50 INJECTION, SOLUTION INTRAMUSCULAR; INTRAVENOUS
Status: DISCONTINUED | OUTPATIENT
Start: 2019-12-06 | End: 2019-12-06 | Stop reason: HOSPADM

## 2019-12-06 RX ORDER — DOBUTAMINE HYDROCHLORIDE 200 MG/100ML
2-20 INJECTION INTRAVENOUS CONTINUOUS PRN
Status: DISCONTINUED | OUTPATIENT
Start: 2019-12-06 | End: 2019-12-06 | Stop reason: HOSPADM

## 2019-12-06 RX ORDER — NALOXONE HYDROCHLORIDE 0.4 MG/ML
.1-.4 INJECTION, SOLUTION INTRAMUSCULAR; INTRAVENOUS; SUBCUTANEOUS
Status: DISCONTINUED | OUTPATIENT
Start: 2019-12-06 | End: 2019-12-06 | Stop reason: HOSPADM

## 2019-12-06 RX ORDER — VERAPAMIL HYDROCHLORIDE 2.5 MG/ML
INJECTION, SOLUTION INTRAVENOUS
Status: DISCONTINUED
Start: 2019-12-06 | End: 2019-12-06 | Stop reason: HOSPADM

## 2019-12-06 RX ORDER — FENTANYL CITRATE 50 UG/ML
INJECTION, SOLUTION INTRAMUSCULAR; INTRAVENOUS
Status: DISCONTINUED | OUTPATIENT
Start: 2019-12-06 | End: 2019-12-06 | Stop reason: HOSPADM

## 2019-12-06 RX ORDER — EPTIFIBATIDE 2 MG/ML
2 INJECTION, SOLUTION INTRAVENOUS CONTINUOUS PRN
Status: DISCONTINUED | OUTPATIENT
Start: 2019-12-06 | End: 2019-12-06 | Stop reason: HOSPADM

## 2019-12-06 RX ORDER — IOPAMIDOL 755 MG/ML
INJECTION, SOLUTION INTRAVASCULAR
Status: DISCONTINUED | OUTPATIENT
Start: 2019-12-06 | End: 2019-12-06 | Stop reason: HOSPADM

## 2019-12-06 RX ORDER — NITROGLYCERIN 20 MG/100ML
.07-2 INJECTION INTRAVENOUS CONTINUOUS PRN
Status: DISCONTINUED | OUTPATIENT
Start: 2019-12-06 | End: 2019-12-06 | Stop reason: HOSPADM

## 2019-12-06 RX ORDER — POTASSIUM CHLORIDE 1500 MG/1
20 TABLET, EXTENDED RELEASE ORAL
Status: COMPLETED | OUTPATIENT
Start: 2019-12-06 | End: 2019-12-06

## 2019-12-06 RX ORDER — LIDOCAINE HYDROCHLORIDE 10 MG/ML
INJECTION, SOLUTION EPIDURAL; INFILTRATION; INTRACAUDAL; PERINEURAL
Status: DISCONTINUED
Start: 2019-12-06 | End: 2019-12-06 | Stop reason: HOSPADM

## 2019-12-06 RX ORDER — HEPARIN SODIUM 1000 [USP'U]/ML
INJECTION, SOLUTION INTRAVENOUS; SUBCUTANEOUS
Status: DISCONTINUED | OUTPATIENT
Start: 2019-12-06 | End: 2019-12-06 | Stop reason: HOSPADM

## 2019-12-06 RX ORDER — HEPARIN SODIUM 10000 [USP'U]/100ML
100-1000 INJECTION, SOLUTION INTRAVENOUS CONTINUOUS PRN
Status: DISCONTINUED | OUTPATIENT
Start: 2019-12-06 | End: 2019-12-06 | Stop reason: HOSPADM

## 2019-12-06 RX ORDER — NITROGLYCERIN 5 MG/ML
VIAL (ML) INTRAVENOUS
Status: DISCONTINUED | OUTPATIENT
Start: 2019-12-06 | End: 2019-12-06 | Stop reason: HOSPADM

## 2019-12-06 RX ORDER — HEPARIN SODIUM 1000 [USP'U]/ML
INJECTION, SOLUTION INTRAVENOUS; SUBCUTANEOUS
Status: DISCONTINUED
Start: 2019-12-06 | End: 2019-12-06 | Stop reason: HOSPADM

## 2019-12-06 RX ORDER — ATROPINE SULFATE 0.1 MG/ML
0.5 INJECTION INTRAVENOUS EVERY 5 MIN PRN
Status: DISCONTINUED | OUTPATIENT
Start: 2019-12-06 | End: 2019-12-06 | Stop reason: HOSPADM

## 2019-12-06 RX ORDER — ARGATROBAN 1 MG/ML
150 INJECTION, SOLUTION INTRAVENOUS
Status: DISCONTINUED | OUTPATIENT
Start: 2019-12-06 | End: 2019-12-06 | Stop reason: HOSPADM

## 2019-12-06 RX ORDER — FLUMAZENIL 0.1 MG/ML
0.2 INJECTION, SOLUTION INTRAVENOUS
Status: DISCONTINUED | OUTPATIENT
Start: 2019-12-06 | End: 2019-12-06 | Stop reason: HOSPADM

## 2019-12-06 RX ORDER — ACETAMINOPHEN 325 MG/1
650 TABLET ORAL EVERY 4 HOURS PRN
Status: DISCONTINUED | OUTPATIENT
Start: 2019-12-06 | End: 2019-12-06 | Stop reason: HOSPADM

## 2019-12-06 RX ORDER — DOPAMINE HYDROCHLORIDE 160 MG/100ML
2-20 INJECTION, SOLUTION INTRAVENOUS CONTINUOUS PRN
Status: DISCONTINUED | OUTPATIENT
Start: 2019-12-06 | End: 2019-12-06 | Stop reason: HOSPADM

## 2019-12-06 RX ORDER — NITROGLYCERIN 5 MG/ML
VIAL (ML) INTRAVENOUS
Status: DISCONTINUED
Start: 2019-12-06 | End: 2019-12-06 | Stop reason: HOSPADM

## 2019-12-06 RX ADMIN — LIDOCAINE HYDROCHLORIDE 1 ML: 10 INJECTION, SOLUTION INFILTRATION; PERINEURAL at 12:24

## 2019-12-06 RX ADMIN — VERAPAMIL HYDROCHLORIDE 2.5 MG: 2.5 INJECTION, SOLUTION INTRAVENOUS at 12:27

## 2019-12-06 RX ADMIN — SODIUM CHLORIDE: 9 INJECTION, SOLUTION INTRAVENOUS at 10:49

## 2019-12-06 RX ADMIN — POTASSIUM CHLORIDE 20 MEQ: 1500 TABLET, EXTENDED RELEASE ORAL at 10:47

## 2019-12-06 RX ADMIN — IOPAMIDOL 36 ML: 755 INJECTION, SOLUTION INTRAVENOUS at 12:42

## 2019-12-06 RX ADMIN — HEPARIN SODIUM 5000 UNITS: 1000 INJECTION, SOLUTION INTRAVENOUS; SUBCUTANEOUS at 12:28

## 2019-12-06 RX ADMIN — FENTANYL CITRATE 50 MCG: 50 INJECTION, SOLUTION INTRAMUSCULAR; INTRAVENOUS at 12:24

## 2019-12-06 RX ADMIN — ASPIRIN 81 MG: 81 TABLET, COATED ORAL at 10:47

## 2019-12-06 RX ADMIN — NITROGLYCERIN 200 MCG: 5 INJECTION, SOLUTION INTRAVENOUS at 12:27

## 2019-12-06 RX ADMIN — MIDAZOLAM 1 MG: 1 INJECTION INTRAMUSCULAR; INTRAVENOUS at 12:25

## 2019-12-06 ASSESSMENT — MIFFLIN-ST. JEOR: SCORE: 1258.44

## 2019-12-06 NOTE — PRE-PROCEDURE
GENERAL PRE-PROCEDURE:   Procedure:  Heart cath  Date/Time:  12/6/2019 12:19 PM    Written consent obtained?: Yes    Risks and benefits: Risks, benefits and alternatives were discussed    DC Plan: Appropriate discharge home plan in place for patients who are going home after procedure   Consent given by:  Patient  Patient states understanding of procedure being performed: Yes    Patient's understanding of procedure matches consent: Yes    Procedure consent matches procedure scheduled: Yes    Expected level of sedation:  Moderate  Appropriately NPO:  Yes  ASA Class:  Class 3- Severe systemic disease, definite functional limitations  Mallampati  :  Grade 2- soft palate, base of uvula, tonsillar pillars, and portion of posterior pharyngeal wall visible  Lungs:  Lungs clear with good breath sounds bilaterally  Heart:  Normal heart sounds and rate  History & Physical reviewed:  History and physical reviewed and no updates needed  Statement of review:  I have reviewed the lab findings, diagnostic data, medications, and the plan for sedation

## 2019-12-07 NOTE — TELEPHONE ENCOUNTER
Cardiology brief note, telephone call:    Discussed results of coronary angiogram with Pt before she leaves for [place]. Explained that the stress echocardiogram was a false positive. I reviewed coronary angiogram and also the stress echocardiogram images, and indeed I see again the regional wall motion abnormalities that were reported in the original study. Incidentally I was also the reader of the original study.   Her coronary arteries are in excellent condition, and so I offered her the option to continue the aspirin or stop it, and she will stop it, which I think is reasonable. I started metoprolol succinate 25mg daily for anti-angina, and so we will stop this. Her LDL was elevated in October, and she has not had any side effects from the rosuvastatin, and so we will continue this.    Clayton Silver MD  Cardiology  Pager:  [phone]  Text Page   December 6, 2019

## 2019-12-09 LAB — INTERPRETATION ECG - MUSE: NORMAL

## 2019-12-11 NOTE — PROGRESS NOTES
Cardiology Clinic Progress Note    Service Date: December 16, 2019    PRIMARY CARDIOLOGIST:  Dr. Clayton Silver      REASON FOR VISIT:  Follow up post coronary angiogram    HPI:   I met Ms. Suni Avila today. She is a very pleasant 69 year old female with a history of former smoking (20 pack year history), breast cancer status post radiation and mastectomy, and hyperlipidemia. She was recently seen in the clinic on 11/19/2019 by Dr. Silver to establish care with cardiology due to an abnormal stress echocardiogram. The stress echocardiogram showed normal biventricular function at rest, however, with stress there was hypokinesis of the basal to apical inferior and basal to mid inferolateral wall segments. This was in the setting of a few months of worsening shortness of breath with exertion. 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 Given the abnormal stress echocardiogram, she was started on a regimen of aspirin 81 mg daily, rosuvastatin 20 mg daily, and metoprolol succinate 25 mg daily.      She was set up for a coronary angiogram. This was performed on 12/06/2019 and showed normal coronary arteries. Dr. Silver reviewed this with the patient by phone, explained that the stress echocardiogram was a false positive, and offered her the option to continue on aspirin or stop it. She subsequently stopped it. Metoprolol succinate 25 mg daily was started for anti-anginal purposes, so this was also stopped. Her LDL was elevated in October at 114 mg/dL, and she has not had any side effects from the rosuvastatin, so this was continued.    Today, she presents to the clinic in follow up after her coronary angiogram. She tells me that she was relieved to hear of the normal results. She has been feeling quite well and denies concerns for chest pain, palpitations, dizziness, presyncope, or syncope. She has  continued to have some mild, intermittent dyspnea on exertion but feels this has improved. She attributes part of her symptoms to some deconditioning as her dog unfortunately recently passed away and she has not been going for walks like she previously did now since it has gotten cold. She and her  are planning to leave for [place] for the winter months in the next couple of weeks. [place] in the [place] around the [place] area until March. She's looking forward to some time in some warmer weather and is hoping to be more physically active and go for more walks while they are there. She thinks they may get another dog this summer to help keep them active. She has been doing a weight watchers diet in an effort to lose some weight as well.    ASSESSMENT AND PLAN:  1. Dyslipidemia    Continue rosuvastatin 20 mg once daily.     Will repeat a fasting lipid profile and ALT once she returns from [place] this spring.    Thank you for allowing me to participate in this very nice patient's care. We will recheck a fasting lipid profile once she returns from [place] in the spring. She can otherwise follow up with Cardiology on an as needed basis going forward and I encouraged her to call the clinic with any questions or concerns down the road. She will continue with routine preventative care with her primary care physician, Dr. Pal.     JUDY Deutsch, CNP   Text Page  (8am - 5pm, M-F)    Orders this Visit:  Orders Placed This Encounter   Procedures     Lipid panel reflex to direct LDL Fasting     ALT     No orders of the defined types were placed in this encounter.    Medications Discontinued During This Encounter   Medication Reason     metoprolol succinate ER (TOPROL-XL) 25 MG 24 hr tablet Therapy completed     aspirin (ASA) 81 MG tablet Therapy completed     Encounter Diagnosis   Name Primary?     Hyperlipidemia LDL goal <100 Yes     CURRENT MEDICATIONS:  Current Outpatient Medications   Medication Sig  "Dispense Refill     Multiple Vitamins-Minerals (MULTIVITAMIN GUMMIES ADULTS) CHEW Take 2 chew tab by mouth daily       rosuvastatin (CRESTOR) 20 MG tablet Take 1 tablet (20 mg) by mouth daily 90 tablet 3     ALLERGIES  Allergies   Allergen Reactions     No Known Drug Allergy      Latex Rash     PAST MEDICAL, SURGICAL, FAMILY HISTORY:  History was reviewed and updated as needed, see medical record.    SOCIAL HISTORY:  She is . 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 She is a former smoker and quit around 15 years ago. She drinks alcohol occasionally, usually not more than 3 drinks in a week. No other drug use.    Review of Systems:  Skin:  Negative     Eyes:  Positive for glasses  ENT:  Negative    Respiratory:  Negative for    Cardiovascular:  Negative    Gastroenterology: Negative    Genitourinary:  Negative    Musculoskeletal:  Negative    Neurologic:  Negative    Psychiatric:  Negative    Heme/Lymph/Imm:  Negative    Endocrine:  Negative       Physical Exam:  Vitals: /84 (BP Location: Right arm, Patient Position: Sitting, Cuff Size: Adult Regular)   Pulse 76   Ht 1.626 m (5' 4.02\")   Wt 74.8 kg (165 lb)   SpO2 96%   BMI 28.31 kg/m     Wt Readings from Last 4 Encounters:   12/16/19 74.8 kg (165 lb)   12/06/19 74.8 kg (165 lb)   11/19/19 75.7 kg (166 lb 14.4 oz)   10/29/19 75.8 kg (167 lb)     GEN: Appears her stated age, sitting comfortably, and in no acute distress.  HEENT:  Normocephalic, atraumatic. Pupils equal, round. Sclerae nonicteric.   NECK: No JVD appreciated.  C/V:  Regular rate and rhythm, normal S1 and S2, no murmur, rub or gallop. Radial pulses are 2+ bilaterally.  RESP: Respirations are unlabored. Chest wall is symmetrical with respirations. Lungs are clear to auscultation bilaterally without wheezing, rales, or rhonchi.  EXTREM: No clubbing, cyanosis, or lower extremity edema bilaterally.  NEURO: Alert and oriented, cooperative. " Gait steady. No gross focal deficits.  PSYCH: Affect and mood appropriate. Mentation normal.   SKIN: Warm and dry. The right radial site appears well healed with no ecchymosis, hematoma, or erythema.    Recent Lab Results:  LIPID RESULTS:  Lab Results   Component Value Date    CHOL 201 (H) 10/29/2019    HDL 70 10/29/2019     (H) 10/29/2019    TRIG 85 10/29/2019    CHOLHDLRATIO 2.4 05/13/2011     LIVER ENZYME RESULTS:  Lab Results   Component Value Date    AST 20 10/29/2019    ALT 30 10/29/2019     CBC RESULTS:  Lab Results   Component Value Date    WBC 6.9 12/03/2019    RBC 4.75 12/03/2019    HGB 13.6 12/03/2019    HCT 43.0 12/03/2019    MCV 91 12/03/2019    MCH 28.6 12/03/2019    MCHC 31.6 12/03/2019    RDW 12.7 12/03/2019     12/03/2019     BMP RESULTS:  Lab Results   Component Value Date     12/03/2019    POTASSIUM 3.9 12/03/2019    CHLORIDE 111 (H) 12/03/2019    CO2 23 12/03/2019    ANIONGAP 7 12/03/2019     (H) 12/03/2019    BUN 15 12/03/2019    CR 0.62 12/03/2019    GFRESTIMATED >90 12/03/2019    GFRESTBLACK >90 12/03/2019    ALFA 8.9 12/03/2019     CC  Clayton Silver MD  [address], [address]  [place] [address]    This note was completed in part using Dragon voice recognition software. Although reviewed after completion, some word and grammatical errors may occur.

## 2019-12-12 PROBLEM — E78.5 HYPERLIPIDEMIA LDL GOAL <100: Status: ACTIVE | Noted: 2019-12-12

## 2019-12-16 ENCOUNTER — OFFICE VISIT (OUTPATIENT)
Dept: CARDIOLOGY | Facility: CLINIC | Age: 69
End: 2019-12-16
Payer: MEDICARE

## 2019-12-16 VITALS
OXYGEN SATURATION: 96 % | HEIGHT: 64 IN | HEART RATE: 76 BPM | SYSTOLIC BLOOD PRESSURE: 126 MMHG | BODY MASS INDEX: 28.17 KG/M2 | DIASTOLIC BLOOD PRESSURE: 84 MMHG | WEIGHT: 165 LBS

## 2019-12-16 DIAGNOSIS — E78.5 HYPERLIPIDEMIA LDL GOAL <100: Primary | ICD-10-CM

## 2019-12-16 PROCEDURE — 99214 OFFICE O/P EST MOD 30 MIN: CPT | Performed by: NURSE PRACTITIONER

## 2019-12-16 ASSESSMENT — MIFFLIN-ST. JEOR: SCORE: 1258.69

## 2019-12-16 NOTE — PATIENT INSTRUCTIONS
Thanks for coming into the [facility] today.    Today's plan:   1. Your coronary angiogram results looked great with normal coronary arteries.  2. Continue on the rosuvastatin (Crestor).  3. Check fasting labs in about 2 months or when your get back from [place] to recheck your cholesterol levels.  4. Follow up with cardiology on an as needed basis. Please feel free to call the number below if you have any questions or concerns going forward.    If you have questions or concerns, please call my nurse at [phone].    Scheduling phone number: [phone]    Reminder: Please bring in all current medications, any over the counter supplements, and any vitamin bottles to your next appointment.    It was a pleasure seeing you today!     Ryan Guerrero, JUDY, CNP   12/16/19

## 2019-12-16 NOTE — LETTER
12/16/2019    Akil Pal MD  [address]  [facility] [address]    RE: Suni Avila       Dear Colleague,    I had the pleasure of seeing Suni Avila in the [facility].    Cardiology Clinic Progress Note    Service Date: December 16, 2019    PRIMARY CARDIOLOGIST:   Dr. Clayton Silver      REASON FOR VISIT:  Follow up post coronary angiogram    HPI:   I met Ms. Suni Avila today. She is a very pleasant 69 year old female with a history of former smoking (20 pack year history), breast cancer status post radiation and mastectomy, and hyperlipidemia. She was recently seen in the clinic on 11/19/2019 by Dr. Silver to establish care with cardiology due to an abnormal stress echocardiogram. The stress echocardiogram showed normal biventricular function at rest, however, with stress there was hypokinesis of the basal to apical inferior and basal to mid inferolateral wall segments. This was in the setting of a few months of worsening shortness of breath with exertion. 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 Given the abnormal stress echocardiogram, she was started on a regimen of aspirin 81 mg daily, rosuvastatin 20 mg daily, and metoprolol succinate 25 mg daily.      She was set up for a coronary angiogram. This was performed on 12/06/2019 and showed normal coronary arteries. Dr. Silver reviewed this with the patient by phone, explained that the stress echocardiogram was a false positive, and offered her the option to continue on aspirin or stop it. She subsequently stopped it. Metoprolol succinate 25 mg daily was started for anti-anginal purposes, so this was also stopped. Her LDL was elevated in October at 114 mg/dL, and she has not had any side effects from the rosuvastatin, so this was continued.    Today, she presents to the clinic in follow up after  her coronary angiogram. She tells me that she was relieved to hear of the normal results. She has been feeling quite well and denies concerns for chest pain, palpitations, dizziness, presyncope, or syncope. She has continued to have some mild, intermittent dyspnea on exertion but feels this has improved. She attributes part of her symptoms to some deconditioning as her dog unfortunately recently passed away and she has not been going for walks like she previously did now since it has gotten cold. She and her  are planning to leave for [place] for the winter months in the next couple of weeks. [place] in the [place] around the [place] area until March. She's looking forward to some time in some warmer weather and is hoping to be more physically active and go for more walks while they are there. She thinks they may get another dog this summer to help keep them active. She has been doing a weight watchers diet in an effort to lose some weight as well.    ASSESSMENT AND PLAN:  1. Dyslipidemia    Continue rosuvastatin 20 mg once daily.     Will repeat a fasting lipid profile and ALT once she returns from [place] this spring.    Thank you for allowing me to participate in this very nice patient's care. We will recheck a fasting lipid profile once she returns from [place] in the spring. She can otherwise follow up with Cardiology on an as needed basis going forward and I encouraged her to call the clinic with any questions or concerns down the road. She will continue with routine preventative care with her primary care physician, Dr. Pal.     JUDY Deutsch, CNP   Text Page  (8am - 5pm, M-F)    Orders this Visit:  Orders Placed This Encounter   Procedures     Lipid panel reflex to direct LDL Fasting     ALT     No orders of the defined types were placed in this encounter.    Medications Discontinued During This Encounter   Medication Reason     metoprolol succinate ER (TOPROL-XL) 25 MG 24 hr tablet  "Therapy completed     aspirin (ASA) 81 MG tablet Therapy completed     Encounter Diagnosis   Name Primary?     Hyperlipidemia LDL goal <100 Yes     CURRENT MEDICATIONS:  Current Outpatient Medications   Medication Sig Dispense Refill     Multiple Vitamins-Minerals (MULTIVITAMIN GUMMIES ADULTS) CHEW Take 2 chew tab by mouth daily       rosuvastatin (CRESTOR) 20 MG tablet Take 1 tablet (20 mg) by mouth daily 90 tablet 3     ALLERGIES  Allergies   Allergen Reactions     No Known Drug Allergy      Latex Rash     PAST MEDICAL, SURGICAL, FAMILY HISTORY:  History was reviewed and updated as needed, see medical record.    SOCIAL HISTORY:  She is . 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 She is a former smoker and quit around 15 years ago. She drinks alcohol occasionally, usually not more than 3 drinks in a week. No other drug use.    Review of Systems:  Skin:  Negative     Eyes:  Positive for glasses  ENT:  Negative    Respiratory:  Negative for    Cardiovascular:  Negative    Gastroenterology: Negative    Genitourinary:  Negative    Musculoskeletal:  Negative    Neurologic:  Negative    Psychiatric:  Negative    Heme/Lymph/Imm:  Negative    Endocrine:  Negative       Physical Exam:  Vitals: /84 (BP Location: Right arm, Patient Position: Sitting, Cuff Size: Adult Regular)   Pulse 76   Ht 1.626 m (5' 4.02\")   Wt 74.8 kg (165 lb)   SpO2 96%   BMI 28.31 kg/m      Wt Readings from Last 4 Encounters:   12/16/19 74.8 kg (165 lb)   12/06/19 74.8 kg (165 lb)   11/19/19 75.7 kg (166 lb 14.4 oz)   10/29/19 75.8 kg (167 lb)     GEN: Appears her stated age, sitting comfortably, and in no acute distress.  HEENT:  Normocephalic, atraumatic. Pupils equal, round. Sclerae nonicteric.   NECK: No JVD appreciated.  C/V:  Regular rate and rhythm, normal S1 and S2, no murmur, rub or gallop. Radial pulses are 2+ bilaterally.  RESP: Respirations are unlabored. Chest wall is " symmetrical with respirations. Lungs are clear to auscultation bilaterally without wheezing, rales, or rhonchi.  EXTREM: No clubbing, cyanosis, or lower extremity edema bilaterally.  NEURO: Alert and oriented, cooperative. Gait steady. No gross focal deficits.  PSYCH: Affect and mood appropriate. Mentation normal.   SKIN: Warm and dry. The right radial site appears well healed with no ecchymosis, hematoma, or erythema.    Recent Lab Results:  LIPID RESULTS:  Lab Results   Component Value Date    CHOL 201 (H) 10/29/2019    HDL 70 10/29/2019     (H) 10/29/2019    TRIG 85 10/29/2019    CHOLHDLRATIO 2.4 05/13/2011     LIVER ENZYME RESULTS:  Lab Results   Component Value Date    AST 20 10/29/2019    ALT 30 10/29/2019     CBC RESULTS:  Lab Results   Component Value Date    WBC 6.9 12/03/2019    RBC 4.75 12/03/2019    HGB 13.6 12/03/2019    HCT 43.0 12/03/2019    MCV 91 12/03/2019    MCH 28.6 12/03/2019    MCHC 31.6 12/03/2019    RDW 12.7 12/03/2019     12/03/2019     BMP RESULTS:  Lab Results   Component Value Date     12/03/2019    POTASSIUM 3.9 12/03/2019    CHLORIDE 111 (H) 12/03/2019    CO2 23 12/03/2019    ANIONGAP 7 12/03/2019     (H) 12/03/2019    BUN 15 12/03/2019    CR 0.62 12/03/2019    GFRESTIMATED >90 12/03/2019    GFRESTBLACK >90 12/03/2019    ALFA 8.9 12/03/2019     CC  Clayton Silver MD  [address], [address]  [place] [address]    This note was completed in part using Dragon voice recognition software. Although reviewed after completion, some word and grammatical errors may occur.    Thank you for allowing me to participate in the care of your patient.    Sincerely,     Eduardo Guerrero NP     [facility]

## 2020-02-04 ENCOUNTER — TRANSFERRED RECORDS (OUTPATIENT)
Dept: HEALTH INFORMATION MANAGEMENT | Facility: CLINIC | Age: 70
End: 2020-02-04

## 2020-06-12 ENCOUNTER — TRANSFERRED RECORDS (OUTPATIENT)
Dept: HEALTH INFORMATION MANAGEMENT | Facility: CLINIC | Age: 70
End: 2020-06-12

## 2020-10-05 ENCOUNTER — OFFICE VISIT (OUTPATIENT)
Dept: FAMILY MEDICINE | Facility: CLINIC | Age: 70
End: 2020-10-05
Payer: COMMERCIAL

## 2020-10-05 VITALS
SYSTOLIC BLOOD PRESSURE: 152 MMHG | WEIGHT: 171 LBS | HEIGHT: 64 IN | OXYGEN SATURATION: 95 % | TEMPERATURE: 99.2 F | BODY MASS INDEX: 29.19 KG/M2 | HEART RATE: 64 BPM | DIASTOLIC BLOOD PRESSURE: 90 MMHG

## 2020-10-05 DIAGNOSIS — E78.5 HYPERLIPIDEMIA LDL GOAL <100: ICD-10-CM

## 2020-10-05 DIAGNOSIS — Z23 NEED FOR PROPHYLACTIC VACCINATION AND INOCULATION AGAINST INFLUENZA: ICD-10-CM

## 2020-10-05 DIAGNOSIS — C50.912 MALIGNANT NEOPLASM OF LEFT FEMALE BREAST, UNSPECIFIED ESTROGEN RECEPTOR STATUS, UNSPECIFIED SITE OF BREAST (H): ICD-10-CM

## 2020-10-05 DIAGNOSIS — Z01.818 PRE-OP EXAM: Primary | ICD-10-CM

## 2020-10-05 DIAGNOSIS — H26.9 CATARACT, UNSPECIFIED CATARACT TYPE, UNSPECIFIED LATERALITY: ICD-10-CM

## 2020-10-05 PROCEDURE — 90662 IIV NO PRSV INCREASED AG IM: CPT | Performed by: INTERNAL MEDICINE

## 2020-10-05 PROCEDURE — 99214 OFFICE O/P EST MOD 30 MIN: CPT | Mod: 25 | Performed by: INTERNAL MEDICINE

## 2020-10-05 PROCEDURE — G0008 ADMIN INFLUENZA VIRUS VAC: HCPCS | Performed by: INTERNAL MEDICINE

## 2020-10-05 SDOH — ECONOMIC STABILITY: FOOD INSECURITY: WITHIN THE PAST 12 MONTHS, YOU WORRIED THAT YOUR FOOD WOULD RUN OUT BEFORE YOU GOT MONEY TO BUY MORE.: NOT ASKED

## 2020-10-05 SDOH — ECONOMIC STABILITY: INCOME INSECURITY: HOW HARD IS IT FOR YOU TO PAY FOR THE VERY BASICS LIKE FOOD, HOUSING, MEDICAL CARE, AND HEATING?: NOT ASKED

## 2020-10-05 SDOH — ECONOMIC STABILITY: FOOD INSECURITY: WITHIN THE PAST 12 MONTHS, THE FOOD YOU BOUGHT JUST DIDN'T LAST AND YOU DIDN'T HAVE MONEY TO GET MORE.: NOT ASKED

## 2020-10-05 SDOH — ECONOMIC STABILITY: TRANSPORTATION INSECURITY
IN THE PAST 12 MONTHS, HAS THE LACK OF TRANSPORTATION KEPT YOU FROM MEDICAL APPOINTMENTS OR FROM GETTING MEDICATIONS?: NOT ASKED

## 2020-10-05 SDOH — ECONOMIC STABILITY: TRANSPORTATION INSECURITY
IN THE PAST 12 MONTHS, HAS LACK OF TRANSPORTATION KEPT YOU FROM MEETINGS, WORK, OR FROM GETTING THINGS NEEDED FOR DAILY LIVING?: NOT ASKED

## 2020-10-05 ASSESSMENT — MIFFLIN-ST. JEOR: SCORE: 1280.65

## 2020-10-05 NOTE — PROGRESS NOTES
[address] [facility] [address]  Phone: [phone]  Primary Provider: Akil Pal      PREOPERATIVE EVALUATION:  Today's date: 10/5/2020    Suni Avila is a 70 year old female who presents for a preoperative evaluation.    Surgical Information:  Surgery Details 10/5/2020   Surgery/Procedure: cataract removal   Surgery Location: [facility]   Surgeon: Dr. Villatoro   Surgery Date: 10/14/2020   Time of Surgery: TBD   Where patient plans to recover: At home with family   Additional recovery plan details: N/A     Fax number for surgical facility: Note does not need to be faxed, will be available electronically in Epic.  Type of Anesthesia Anticipated: General    Subjective     HPI related to upcoming procedure: see below  Preop Questions 10/5/2020   1. Have you ever had a heart attack or stroke? No   2. Have you ever had surgery on your heart or blood vessels, such as a stent placement, a coronary artery bypass, or surgery on an artery in your head, neck, heart, or legs? No   3. Do you have chest pain with activity? No   4. Do you have a history of  heart failure? No   5. Do you currently have a cold, bronchitis or symptoms of other infection? No   6. Do you have a cough, shortness of breath, or wheezing? No   7. Do you or anyone in your family have previous history of blood clots? No   8. Do you or does anyone in your family have a serious bleeding problem such as prolonged bleeding following surgeries or cuts? No   9. Have you ever had problems with anemia or been told to take iron pills? No   10. Have you had any abnormal blood loss such as black, tarry or bloody stools, or abnormal vaginal bleeding? No   11. Have you ever had a blood transfusion? No   Are you willing to have a blood transfusion if it is medically needed before, during, or after your surgery? Yes   13. Have you or any of your relatives ever had problems with anesthesia? No   14. Do you have sleep  apnea, excessive snoring or daytime drowsiness? No   15. Do you have any artifical heart valves or other implanted medical devices like a pacemaker, defibrillator, or continuous glucose monitor? No   16. Do you have artificial joints? YES -    17. Are you allergic to latex? No   18. Is there any chance that you may be pregnant? No     Patient does not have a Health Care Directive or Living Will: Advance Directive received and scanned. Click on Code in the patient header to view.    The patient is having surgery for cataract.  She feels fine, no chest pain or shortness of breath, no c/o on review of systems.                Past Medical History:      Past Medical History:   Diagnosis Date     Chest pain 2019    pos est, then angiogram done and no cad     Colonic polyp 03/2012    [place] [facility], tubular adenoma, fu 2017     DCIS (ductal carcinoma in situ) of breast 2004    had xrt     History of breast cancer 2007    left, bilat mast and reconstruction, Dr. Cuevas     Hyperlipidemia LDL goal <100 12/12/2019             Past Surgical History:      Past Surgical History:   Procedure Laterality Date     APPENDECTOMY  2002     ARTHROPLASTY KNEE UNICOMPARTMENT  9/13/2012    Procedure: ARTHROPLASTY KNEE UNICOMPARTMENT;  RIGHT KNEE UNICOMPARTMENTAL ARTHROPLASTY ;  Surgeon: Dakota Haines MD;  Location:  OR     ARTHROPLASTY KNEE UNICOMPARTMENT Left 5/24/2016    Procedure: ARTHROPLASTY KNEE UNICOMPARTMENT;  Surgeon: Dakota Haines MD;  Location:  OR     BREAST RECONSTRUCTION WITH DEEP INFERIOR EPIGASTRIC  (ABRAHAM) FLAP,  2009     bunion surgery  1975 and 2007     BUNIONECTOMY IVETH  5/9/2012    Procedure:BUNIONECTOMY IVETH; RIGHT HALLUX VALGUS WITH SECOND CLAWTOE RECONSTRUCTION; Surgeon:PHONG HENDRICKSON; Location: OR     CV CORONARY ANGIOGRAM N/A 12/6/2019    Procedure: Coronary Angiogram;  Surgeon: Jose Luis Montero MD;  Location:  HEART CARDIAC CATH LAB     EXCISE LESION TRUNK N/A 11/13/2015     "Procedure: EXCISE LESION TRUNK;  Surgeon: Jose Luis Hawkins MD;  Location: [facility]     FOOT SURGERY  08/2017    at o     fractured arm  2009     MASTECTOMY BILATERAL, INSERT TISSUE EXPANDER BILATERAL, COMBINED  2008    breast cancer     RECONSTRUCT BREAST Left 11/13/2015    Procedure: RECONSTRUCT BREAST;  Surgeon: Jose Luis Hawkins MD;  Location: [facility]     REVISE RECONSTRUCTED BREAST  1/27/2012    Procedure:REVISE RECONSTRUCTED BREAST; LEFT BREAST CAPSULORRAPHY; Surgeon:JOSE LUIS HAWKINS; Location:[facility]     TOE SURGERY  2014             Social History:     Social History     Tobacco Use     Smoking status: Former Smoker     Years: 20.00     Quit date: 1/21/2005     Years since quitting: 15.7     Smokeless tobacco: Never Used   Substance Use Topics     Alcohol use: Yes     Alcohol/week: 0.0 standard drinks     Comment: 3-4 drinks monthly             Family History:   No family history of bleeding difficulty, anesthesia problems or blood clots.         Allergies:     Allergies   Allergen Reactions     No Known Drug Allergy      Latex Rash             Medications:     Current Outpatient Medications   Medication Sig Dispense Refill     Multiple Vitamins-Minerals (MULTIVITAMIN GUMMIES ADULTS) CHEW Take 2 chew tab by mouth daily                 Review of Systems:   No history of bleeding difficulty, anesthesia problems or blood clots.  The 10 point Review of Systems is negative other than noted in the HPI           Physical Exam:   Blood pressure (!) 152/90, pulse 64, temperature 99.2  F (37.3  C), temperature source Tympanic, height 1.626 m (5' 4\"), weight 77.6 kg (171 lb), SpO2 95 %, not currently breastfeeding.    Constitutional: healthy appearing, alert and in no distress  Heent: Normocephalic. Head without obvious masses or lesions. PERRLDC, EOMI. Mouth exam within normal limits: tongue, mucous membranes, posterior pharynx all normal, no lesions or abnormalities seen.  Tm's and canals within " normal limits bilaterally. Neck supple, no nuchal rigidity or masses. No supraclavicular, or cervical adenopathy. Thyroid symmetric, no masses.  Cardiovascular: Regular rate and rhythm, no murmer, rub or gallops.  JVP not elevated, no edema.  Carotids within normal limits bilaterally, no bruits.  Respiratory: Normal respiratory effort.  Lungs clear, normal flow, no wheezing or crackles.  Gastrointestinal: Normal active bowel sounds.   Soft, not tender, no masses, guarding or rebound.  No hepatosplenomegaly.   Musculoskeletal: extremities normal, no gross deformities noted.  Skin: no suspicious lesions or rashes   Neurologic: Mental status within normal limits.  Speech fluent.  No gross motor abnormalities and gait intact.  Psychiatric: mentation appears normal and affect normal.         Data:   None needed        Assessment:   1. Normal pre op exam for cataract, the patient is low risk for the surgery  2. Breast ca, juan pablo  3. Elevated cholesterol, clean coronaries, no statin needed  4. hcm         Plan:   Flu shot  shingrix at pharm  Routine colon  The patient is ok for the procedure      Akil Pal M.D.

## 2020-10-05 NOTE — PATIENT INSTRUCTIONS
Please try to get your weight down, be sure to exercise    Monitor your blood pressure, sit for 5 minutes first.  Let me know if it is not staying under 140/90    I would recommend getting the new shingles shot called shingrix, but I would do it at your pharmacy as they can check with the insurance company to see if it is paid for.    Do not forget to get the colonoscopy      Akil Pal M.D.

## 2020-11-25 ENCOUNTER — TELEPHONE (OUTPATIENT)
Dept: FAMILY MEDICINE | Facility: CLINIC | Age: 70
End: 2020-11-25

## 2020-11-25 NOTE — TELEPHONE ENCOUNTER
Reason for call:  Patient reporting a symptom    Symptom or request: Decreased appetite, fever up to 100.0, fatigue    Duration (how long have symptoms been present): 11 days    Have you been treated for this before? No    Additional comments:  Patient spouse reports that she is not feeling well since tested positive for COVID 11 days ago. He wants some advise on how to care for her. Patient is currently surviving on Nyquil and Dayquil  to cope. Please follow up with advice on what she should do.    Phone Number patient can be reached at:   [phone]    Best Time:  Any    Can we leave a detailed message on this number:  YES    Call taken on 11/25/2020 at 2:34 PM by Luna Salmeron

## 2020-11-26 NOTE — TELEPHONE ENCOUNTER
Call back to patient and it went to . Left message to call back even tonight and  Nurse advisors would be able to help her.    Joan Kelly RN

## 2021-01-15 ENCOUNTER — HEALTH MAINTENANCE LETTER (OUTPATIENT)
Age: 71
End: 2021-01-15

## 2021-01-21 ENCOUNTER — TRANSFERRED RECORDS (OUTPATIENT)
Dept: HEALTH INFORMATION MANAGEMENT | Facility: CLINIC | Age: 71
End: 2021-01-21

## 2021-02-16 ENCOUNTER — TRANSFERRED RECORDS (OUTPATIENT)
Dept: HEALTH INFORMATION MANAGEMENT | Facility: CLINIC | Age: 71
End: 2021-02-16

## 2021-03-23 ENCOUNTER — IMMUNIZATION (OUTPATIENT)
Dept: NURSING | Facility: CLINIC | Age: 71
End: 2021-03-23
Payer: COMMERCIAL

## 2021-03-23 PROCEDURE — 0001A PR COVID VAC PFIZER DIL RECON 30 MCG/0.3 ML IM: CPT

## 2021-03-23 PROCEDURE — 91300 PR COVID VAC PFIZER DIL RECON 30 MCG/0.3 ML IM: CPT

## 2021-04-13 ENCOUNTER — IMMUNIZATION (OUTPATIENT)
Dept: NURSING | Facility: CLINIC | Age: 71
End: 2021-04-13
Attending: INTERNAL MEDICINE
Payer: COMMERCIAL

## 2021-04-13 PROCEDURE — 91300 PR COVID VAC PFIZER DIL RECON 30 MCG/0.3 ML IM: CPT

## 2021-04-13 PROCEDURE — 0002A PR COVID VAC PFIZER DIL RECON 30 MCG/0.3 ML IM: CPT

## 2021-09-04 ENCOUNTER — APPOINTMENT (OUTPATIENT)
Dept: CT IMAGING | Facility: CLINIC | Age: 71
End: 2021-09-04
Attending: EMERGENCY MEDICINE
Payer: COMMERCIAL

## 2021-09-04 ENCOUNTER — HOSPITAL ENCOUNTER (EMERGENCY)
Facility: CLINIC | Age: 71
Discharge: HOME | End: 2021-09-05
Attending: EMERGENCY MEDICINE | Admitting: EMERGENCY MEDICINE
Payer: COMMERCIAL

## 2021-09-04 DIAGNOSIS — R06.2 WHEEZING: ICD-10-CM

## 2021-09-04 LAB
ALBUMIN SERPL-MCNC: 3.8 G/DL (ref 3.4–5)
ALP SERPL-CCNC: 153 U/L (ref 40–150)
ALT SERPL W P-5'-P-CCNC: 33 U/L (ref 0–50)
ANION GAP SERPL CALCULATED.3IONS-SCNC: 9 MMOL/L (ref 3–14)
AST SERPL W P-5'-P-CCNC: 33 U/L (ref 0–45)
BASOPHILS # BLD AUTO: 0.1 10E3/UL (ref 0–0.2)
BASOPHILS NFR BLD AUTO: 1 %
BILIRUB SERPL-MCNC: 0.3 MG/DL (ref 0.2–1.3)
BUN SERPL-MCNC: 20 MG/DL (ref 7–30)
CALCIUM SERPL-MCNC: 8.3 MG/DL (ref 8.5–10.1)
CHLORIDE BLD-SCNC: 111 MMOL/L (ref 94–109)
CO2 SERPL-SCNC: 22 MMOL/L (ref 20–32)
CREAT SERPL-MCNC: 0.88 MG/DL (ref 0.52–1.04)
EOSINOPHIL # BLD AUTO: 0.2 10E3/UL (ref 0–0.7)
EOSINOPHIL NFR BLD AUTO: 3 %
ERYTHROCYTE [DISTWIDTH] IN BLOOD BY AUTOMATED COUNT: 13 % (ref 10–15)
GFR SERPL CREATININE-BSD FRML MDRD: 66 ML/MIN/1.73M2
GLUCOSE BLD-MCNC: 122 MG/DL (ref 70–99)
HCT VFR BLD AUTO: 41.5 % (ref 35–47)
HGB BLD-MCNC: 13.8 G/DL (ref 11.7–15.7)
HOLD SPECIMEN: NORMAL
IMM GRANULOCYTES # BLD: 0 10E3/UL
IMM GRANULOCYTES NFR BLD: 0 %
LYMPHOCYTES # BLD AUTO: 2.8 10E3/UL (ref 0.8–5.3)
LYMPHOCYTES NFR BLD AUTO: 37 %
MCH RBC QN AUTO: 30.1 PG (ref 26.5–33)
MCHC RBC AUTO-ENTMCNC: 33.3 G/DL (ref 31.5–36.5)
MCV RBC AUTO: 91 FL (ref 78–100)
MONOCYTES # BLD AUTO: 0.5 10E3/UL (ref 0–1.3)
MONOCYTES NFR BLD AUTO: 7 %
NEUTROPHILS # BLD AUTO: 4 10E3/UL (ref 1.6–8.3)
NEUTROPHILS NFR BLD AUTO: 52 %
NRBC # BLD AUTO: 0 10E3/UL
NRBC BLD AUTO-RTO: 0 /100
PLATELET # BLD AUTO: 257 10E3/UL (ref 150–450)
POTASSIUM BLD-SCNC: 3.5 MMOL/L (ref 3.4–5.3)
PROT SERPL-MCNC: 7.1 G/DL (ref 6.8–8.8)
RBC # BLD AUTO: 4.58 10E6/UL (ref 3.8–5.2)
SODIUM SERPL-SCNC: 142 MMOL/L (ref 133–144)
TROPONIN I SERPL-MCNC: <0.015 UG/L (ref 0–0.04)
TROPONIN I SERPL-MCNC: <0.015 UG/L (ref 0–0.04)
WBC # BLD AUTO: 7.7 10E3/UL (ref 4–11)

## 2021-09-04 PROCEDURE — 84484 ASSAY OF TROPONIN QUANT: CPT | Performed by: EMERGENCY MEDICINE

## 2021-09-04 PROCEDURE — 80053 COMPREHEN METABOLIC PANEL: CPT | Performed by: EMERGENCY MEDICINE

## 2021-09-04 PROCEDURE — 250N000009 HC RX 250: Performed by: EMERGENCY MEDICINE

## 2021-09-04 PROCEDURE — 99285 EMERGENCY DEPT VISIT HI MDM: CPT | Mod: 25

## 2021-09-04 PROCEDURE — 84484 ASSAY OF TROPONIN QUANT: CPT | Mod: 91 | Performed by: EMERGENCY MEDICINE

## 2021-09-04 PROCEDURE — 250N000012 HC RX MED GY IP 250 OP 636 PS 637: Performed by: EMERGENCY MEDICINE

## 2021-09-04 PROCEDURE — C9803 HOPD COVID-19 SPEC COLLECT: HCPCS

## 2021-09-04 PROCEDURE — 93005 ELECTROCARDIOGRAM TRACING: CPT

## 2021-09-04 PROCEDURE — 250N000011 HC RX IP 250 OP 636: Performed by: EMERGENCY MEDICINE

## 2021-09-04 PROCEDURE — 85025 COMPLETE CBC W/AUTO DIFF WBC: CPT | Performed by: EMERGENCY MEDICINE

## 2021-09-04 PROCEDURE — 71275 CT ANGIOGRAPHY CHEST: CPT

## 2021-09-04 PROCEDURE — 87635 SARS-COV-2 COVID-19 AMP PRB: CPT | Performed by: EMERGENCY MEDICINE

## 2021-09-04 PROCEDURE — 36415 COLL VENOUS BLD VENIPUNCTURE: CPT | Performed by: EMERGENCY MEDICINE

## 2021-09-04 RX ORDER — IOPAMIDOL 755 MG/ML
500 INJECTION, SOLUTION INTRAVASCULAR ONCE
Status: COMPLETED | OUTPATIENT
Start: 2021-09-04 | End: 2021-09-04

## 2021-09-04 RX ORDER — ALBUTEROL SULFATE 90 UG/1
2 AEROSOL, METERED RESPIRATORY (INHALATION) EVERY 4 HOURS PRN
Qty: 8 G | Refills: 0 | Status: ON HOLD | OUTPATIENT
Start: 2021-09-04 | End: 2022-10-03

## 2021-09-04 RX ORDER — PREDNISONE 20 MG/1
TABLET ORAL
Qty: 10 TABLET | Refills: 0 | Status: SHIPPED | OUTPATIENT
Start: 2021-09-04 | End: 2022-05-27

## 2021-09-04 RX ORDER — PREDNISONE 20 MG/1
40 TABLET ORAL ONCE
Status: COMPLETED | OUTPATIENT
Start: 2021-09-04 | End: 2021-09-04

## 2021-09-04 RX ADMIN — IOPAMIDOL 57 ML: 755 INJECTION, SOLUTION INTRAVENOUS at 22:12

## 2021-09-04 RX ADMIN — SODIUM CHLORIDE 78 ML: 9 INJECTION, SOLUTION INTRAVENOUS at 22:12

## 2021-09-04 RX ADMIN — PREDNISONE 40 MG: 20 TABLET ORAL at 23:07

## 2021-09-04 ASSESSMENT — ENCOUNTER SYMPTOMS: SHORTNESS OF BREATH: 1

## 2021-09-05 VITALS
OXYGEN SATURATION: 93 % | RESPIRATION RATE: 23 BRPM | BODY MASS INDEX: 28.15 KG/M2 | WEIGHT: 164 LBS | TEMPERATURE: 97.7 F | SYSTOLIC BLOOD PRESSURE: 151 MMHG | DIASTOLIC BLOOD PRESSURE: 78 MMHG | HEART RATE: 60 BPM

## 2021-09-05 LAB — SARS-COV-2 RNA RESP QL NAA+PROBE: NEGATIVE

## 2021-09-05 NOTE — ED PROVIDER NOTES
History     Chief Complaint:  Chest Pain and Shortness of Breath       HPI   Suni Avila is a 71 year old female with history of breast cancer, hyperlipidemia who presents with chest pressure and shortness of breath which began approximately 1.5 hours ago after she ate dinner. She reports that she had been feeling well all day leading up to dinner. She states that she took 324 mg of aspirin and took her blood pressure at that time and it was 189 systolic, and en route to the ED via EMS, she reports that it was 200/100. She was not given nitroglycerin. Suni denies history of hypertension, MI.     Coronary Angiogram 12/6/2019 results:  Left Main: The vessel was visualized by selective angiography and is moderate in size. There was 0% vessel disease.  Left Anterior Descending: The vessel was visualized by selective angiography and is moderate in size. There was 0% vessel disease. Type IV LAD supplying much of the inferoseptal wall  Left Circumflex: The vessel was visualized by selective angiography and is moderate in size. There was 0% vessel disease.  Right Coronary Artery: The vessel was visualized by selective angiography and is small. There was 0% vessel disease.    Review of Systems   Respiratory: Positive for shortness of breath.         Chest pressure +   All other systems reviewed and are negative.    Allergies:  Latex    Medications:  The patient is currently on no regular medications.    Past Medical History:    Colonic polyps  Breast cancer  Hyperlipidemia    Past Surgical History:    Appendectomy  Knee arthroplasty, bilateral  Breast reconstruction x3  Bunionectomy x3  Coronary angiogram  Trunk lesion excision  Foot surgery  Arm fracture surgery  Bilateral mastectomy  Toe surgery    Family History:    Father: hypertension     Social History:  Patient presents to the ED with her .  Patient presents to the ED via EMS.    Physical Exam     Patient Vitals for the past 24 hrs:   BP Temp Temp src  Pulse Resp SpO2 Weight   09/05/21 0030 (!) 151/78 -- -- 60 23 93 % --   09/05/21 0015 139/64 -- -- 60 26 94 % --   09/05/21 0000 (!) 141/75 -- -- 65 -- 96 % --   09/04/21 2345 135/67 -- -- 68 23 94 % --   09/04/21 2330 128/79 -- -- 63 16 94 % --   09/04/21 2315 (!) 155/84 -- -- 64 18 94 % --   09/04/21 2300 (!) 140/77 -- -- 65 17 95 % --   09/04/21 2245 135/77 -- -- 65 (!) 33 95 % --   09/04/21 2242 -- -- -- 62 18 97 % --   09/04/21 2230 (!) 147/80 -- -- 57 (!) 36 98 % --   09/04/21 2200 -- -- -- 62 (!) 37 98 % --   09/04/21 2145 -- -- -- 67 20 97 % --   09/04/21 2130 -- -- -- 64 21 97 % --   09/04/21 2115 -- -- -- 65 27 97 % --   09/04/21 2100 -- -- -- 65 19 94 % --   09/04/21 2054 (!) 189/97 97.7  F (36.5  C) Oral 63 20 95 % 74.4 kg (164 lb)       Physical Exam  General: Patient is alert and cooperative.  Well appearing.   HENT:  Normal nose, oropharynx. Moist oral mucosa.  Eyes: EOMI. Normal conjunctiva.  Neck:  Normal range of motion and appearance.   Cardiovascular:  Normal rate, regular rhythm and normal heart sounds.   Pulmonary/Chest:  Effort normal. Bilateral rhonchi and expiratory wheezing.   Abdominal: Soft. No distension or tenderness.     Musculoskeletal: Normal range of motion. No edema or tenderness.   Neurological: oriented, normal strength, sensation, and coordination.   Skin: Warm and dry. No rash or bruising.   Psychiatric: Normal mood and affect. Normal behavior and judgement.      Emergency Department Course     ECG  ECG taken at 2100, ECG read at 2108  Sinus rhythm with fusion complexes  Right bundle branch block  Left anterior fascicular block  Bifascicular block  Possible inferior infarct, age undetermined  Abnormal ECG  Rate 64 bpm. OR interval 158 ms. QRS duration 126 ms. QT/QTc 440/453 ms. P-R-T axes 39 -73 44.     Imaging:    CT Chest Pulmonary Embolism w Contrast  1.  No pulmonary embolism.  2.  Mild aneurysmal dilatation of the ascending aorta measuring 4 cm.  3.  Indeterminate  mediastinal and hilar lymphadenopathy. No comparison studies are available. Follow-up is suggested.  4.  Bronchiolar wall thickening in the lungs compatible with inflammation.  5.  Coronary artery calcification.  Reading per radiology     Laboratory:    CBC: WBC 7.7, HGB 13.8,    CMP: chloride 111 (H), calcium 8.3 (L), glucose 122 (H), alkaline phosphatase 153 (H) o/w WNL (Creatinine 0.88)     Troponin (Collected 2059): <0.015    Asymptomatic COVID19 Virus PCR by nasopharyngeal swab pending at sign-out    Emergency Department Course:    Reviewed:  I reviewed nursing notes, vitals, past medical history, and care everywhere    Assessments:  2124 I obtained history and examined the patient as noted above.   2247 I rechecked the patient and explained findings. She is vaccinated for COVID-19 but we are going to go forward with testing.    Disposition:  Care of the patient was transferred to my colleague Dr. Dupont pending COVID-19 test result.     Impression & Plan     Medical Decision Making:  Afebrile 71-year-old female arrives for evaluation of shortness of breath with some associated mild chest heaviness beginning about an hour and a half before arrival after dinner.  She had been in her usual state of health.  She has no history of known heart or lung disease and in fact had a normal coronary angiogram in December 2019.  She noted her blood pressure to be high at home and arrives via ambulance for evaluation her  called 911.  She did take aspirin before arrival.  She is well-appearing but does have some bilateral coarse rhonchi and expiratory wheezing.  Work-up has included an undetectable troponin, normal CBC, and EKG depicting a sinus rhythm with no acute ischemic appearing ST changes.  CT chest PE protocol shows no pulmonary embolism.  The radiologist is describing bronchiolar wall thickening in the lungs compatible with inflammation.  There is indeterminate mediastinal and hilar lymphadenopathy.   Patient has had no recent URI symptoms and is vaccinated against Covid.  She has no obvious known ill contacts.  She is a former smoker.  This test results were discussed with her and disposition options reviewed.  It seems as though she has developed fairly acute onset wheezing and chest heaviness with radiographic evidence of airway inflammation.  Suspicion for an underlying ischemic process is low and fairly recent normal coronary angiogram is reassuring.  I recommended testing for possible breakthrough Covid infection and repeat troponin with plans for discharge home with a short course of oral corticosteroids.  She was medicated with 40 mg of prednisone here.  She and her spouse are comfortable with this plan with the understanding that she would return if symptoms worsen otherwise follow-up with her clinic early next week for reevaluation.    Covid-19  Suni Avila was evaluated during a global COVID-19 pandemic, which necessitated consideration that the patient might be at risk for infection with the SARS-CoV-2 virus that causes COVID-19.   Applicable protocols for evaluation were followed during the patient's care.   COVID-19 was considered as part of the patient's evaluation. The plan for testing is:  a test was obtained during this visit.    Diagnosis:    ICD-10-CM    1. Wheezing  R06.2        Scribe Disclosure:  I, Thelma Dodd, am serving as a scribe at 9:15 PM on 9/4/2021 to document services personally performed by Eligio Payne MD based on my observations and the provider's statements to me.       Eligio Payne MD  09/05/21 2110

## 2021-09-05 NOTE — ED NOTES
Continues to complain of chest pressure. Like someone is sitting on her chest. Denies calling it pain.  at bedside.

## 2021-09-05 NOTE — ED TRIAGE NOTES
Patient arrives via EMS from home with . Complaining of chest pain and shortness of pressure. Started having chest pressure after dinner. Took blood pressure, elevated 180s at home.  called 911. For EMS, clear lung sounds and in Normal Sinus rhythm on 12 lead. Patient took 325 aspirin at home. 18 gauge IV started.     History of double mastectomy. Takes no medications at home.

## 2021-09-06 LAB
ATRIAL RATE - MUSE: 64 BPM
DIASTOLIC BLOOD PRESSURE - MUSE: NORMAL MMHG
INTERPRETATION ECG - MUSE: NORMAL
P AXIS - MUSE: 39 DEGREES
PR INTERVAL - MUSE: 158 MS
QRS DURATION - MUSE: 126 MS
QT - MUSE: 440 MS
QTC - MUSE: 453 MS
R AXIS - MUSE: -73 DEGREES
SYSTOLIC BLOOD PRESSURE - MUSE: NORMAL MMHG
T AXIS - MUSE: 44 DEGREES
VENTRICULAR RATE- MUSE: 64 BPM

## 2021-09-10 ENCOUNTER — OFFICE VISIT (OUTPATIENT)
Dept: FAMILY MEDICINE | Facility: CLINIC | Age: 71
End: 2021-09-10
Payer: COMMERCIAL

## 2021-09-10 VITALS
OXYGEN SATURATION: 95 % | HEART RATE: 70 BPM | HEIGHT: 64 IN | WEIGHT: 166 LBS | DIASTOLIC BLOOD PRESSURE: 87 MMHG | RESPIRATION RATE: 16 BRPM | SYSTOLIC BLOOD PRESSURE: 131 MMHG | BODY MASS INDEX: 28.34 KG/M2 | TEMPERATURE: 97.7 F

## 2021-09-10 DIAGNOSIS — R06.89 DIFFICULTY BREATHING: Primary | ICD-10-CM

## 2021-09-10 PROCEDURE — 99213 OFFICE O/P EST LOW 20 MIN: CPT | Performed by: NURSE PRACTITIONER

## 2021-09-10 ASSESSMENT — MIFFLIN-ST. JEOR: SCORE: 1252.97

## 2021-09-10 NOTE — PROGRESS NOTES
"    Assessment & Plan   Problem List Items Addressed This Visit     None      Visit Diagnoses     Difficulty breathing    -  Primary    resolved         Pt had unprovoked episode of difficulty breathing and had an excellent ED workup that showed bronchial inflammation.  She was put on steroids and has since felt great. No further workup warranted today. Recommended follow-up with PCP if anything returns.       JUDY Pabon CNP  [facility] JENNIFER Culp is a 71 year old who presents for the following health issues presenting with her      HPI     ED/ Followup:    Facility:  [facility] Emergency Dept    Date of visit: 09/04/2021  Reason for visit:   Wheezing        Had a sudden onset of difficulty breathing and went to the ED on 9/4  She was found to have bronchial inflammation    Was on prednisone for 5 days   Has not needed albuterol   Has been feeling great since discharge. No coughing       Review of Systems   Detailed as above         Objective    /87 (BP Location: Left arm, Patient Position: Sitting, Cuff Size: Adult Regular)   Pulse 70   Temp 97.7  F (36.5  C) (Temporal)   Resp 16   Ht 1.626 m (5' 4\")   Wt 75.3 kg (166 lb)   SpO2 95%   BMI 28.49 kg/m      Physical Exam  Constitutional:       Appearance: Normal appearance.   HENT:      Head: Normocephalic.   Eyes:      Conjunctiva/sclera: Conjunctivae normal.   Cardiovascular:      Rate and Rhythm: Normal rate.   Pulmonary:      Effort: Pulmonary effort is normal. No respiratory distress.      Breath sounds: Normal breath sounds.   Skin:     General: Skin is warm and dry.   Neurological:      Mental Status: She is alert.   Psychiatric:         Mood and Affect: Mood normal.                "

## 2021-10-24 ENCOUNTER — HEALTH MAINTENANCE LETTER (OUTPATIENT)
Age: 71
End: 2021-10-24

## 2022-01-04 ENCOUNTER — TRANSFERRED RECORDS (OUTPATIENT)
Dept: HEALTH INFORMATION MANAGEMENT | Facility: CLINIC | Age: 72
End: 2022-01-04
Payer: COMMERCIAL

## 2022-02-13 ENCOUNTER — HEALTH MAINTENANCE LETTER (OUTPATIENT)
Age: 72
End: 2022-02-13

## 2022-05-10 ENCOUNTER — TRANSFERRED RECORDS (OUTPATIENT)
Dept: HEALTH INFORMATION MANAGEMENT | Facility: CLINIC | Age: 72
End: 2022-05-10
Payer: COMMERCIAL

## 2022-05-27 ENCOUNTER — OFFICE VISIT (OUTPATIENT)
Dept: FAMILY MEDICINE | Facility: CLINIC | Age: 72
End: 2022-05-27
Payer: MEDICARE

## 2022-05-27 VITALS
BODY MASS INDEX: 28.85 KG/M2 | SYSTOLIC BLOOD PRESSURE: 154 MMHG | DIASTOLIC BLOOD PRESSURE: 96 MMHG | OXYGEN SATURATION: 95 % | HEIGHT: 64 IN | TEMPERATURE: 97.7 F | WEIGHT: 169 LBS | RESPIRATION RATE: 16 BRPM | HEART RATE: 61 BPM

## 2022-05-27 DIAGNOSIS — H90.3 ASYMMETRICAL SENSORINEURAL HEARING LOSS: ICD-10-CM

## 2022-05-27 DIAGNOSIS — Z12.11 SCREEN FOR COLON CANCER: ICD-10-CM

## 2022-05-27 DIAGNOSIS — I10 PRIMARY HYPERTENSION: Primary | ICD-10-CM

## 2022-05-27 DIAGNOSIS — E78.5 HYPERLIPIDEMIA LDL GOAL <100: ICD-10-CM

## 2022-05-27 PROCEDURE — 99214 OFFICE O/P EST MOD 30 MIN: CPT | Performed by: INTERNAL MEDICINE

## 2022-05-27 RX ORDER — AMLODIPINE BESYLATE 5 MG/1
5 TABLET ORAL DAILY
Qty: 90 TABLET | Refills: 3 | Status: SHIPPED | OUTPATIENT
Start: 2022-05-27 | End: 2022-10-17

## 2022-05-27 ASSESSMENT — PAIN SCALES - GENERAL: PAINLEVEL: NO PAIN (0)

## 2022-05-27 NOTE — PROGRESS NOTES
Chief Complaint:     Hypertension     HPI:   Patient Suni Avila is a very pleasant 71 year old female with history of hypertension, carpal tunnel syndrome who presents to Internal Medicine clinic today for follow up of poorly controlled chronic hypertension. Patient also complains of unilateral hearing loss in the left ear. No chest pain, headaches, fever or chills.      Current Medications:     Current Outpatient Medications   Medication Sig Dispense Refill     albuterol (PROAIR HFA) 108 (90 Base) MCG/ACT inhaler Inhale 2 puffs into the lungs every 4 hours as needed for shortness of breath / dyspnea 8 g 0     amLODIPine (NORVASC) 5 MG tablet Take 1 tablet (5 mg) by mouth daily 90 tablet 3     Multiple Vitamins-Minerals (MULTIVITAMIN GUMMIES ADULTS) CHEW Take 2 chew tab by mouth daily           Allergies:      Allergies   Allergen Reactions     No Known Drug Allergy      Latex Rash            Past Medical History:     Past Medical History:   Diagnosis Date     Chest pain 2019    pos est, then angiogram done and no cad     Colonic polyp 03/2012    [place], tubular adenoma, fu 2017     DCIS (ductal carcinoma in situ) of breast 2004    had xrt     History of breast cancer 2007    left, bilat mast and reconstruction, Dr. Cuevas     Hyperlipidemia LDL goal <100 12/12/2019         Past Surgical History:     Past Surgical History:   Procedure Laterality Date     APPENDECTOMY  2002     ARTHROPLASTY KNEE UNICOMPARTMENT  9/13/2012    Procedure: ARTHROPLASTY KNEE UNICOMPARTMENT;  RIGHT KNEE UNICOMPARTMENTAL ARTHROPLASTY ;  Surgeon: Dakota Haines MD;  Location:  OR     ARTHROPLASTY KNEE UNICOMPARTMENT Left 5/24/2016    Procedure: ARTHROPLASTY KNEE UNICOMPARTMENT;  Surgeon: Dakota Haines MD;  Location:  OR     BREAST RECONSTRUCTION WITH DEEP INFERIOR EPIGASTRIC  (ABRAHAM) FLAP,  2009     bunion surgery  1975 and 2007     BUNIONECTOMY IVETH  5/9/2012    Procedure:BUNIONECTOMY IVETH; RIGHT HALLUX VALGUS  WITH SECOND CLAWTOE RECONSTRUCTION; Surgeon:PHONG HENDRICKSON; Location: OR     CV CORONARY ANGIOGRAM N/A 2019    Procedure: Coronary Angiogram;  Surgeon: Jose Luis Montero MD;  Location:  HEART CARD[facility] CATH LAB     EXCISE LESION TRUNK N/A 2015    Procedure: EXCISE LESION TRUNK;  Surgeon: Jose Luis Hawkins MD;  Location: [facility]     FOOT SURGERY  2017    at [facility]     fractured arm  2009     MASTECTOMY BILATERAL, INSERT TISSUE EXPANDER BILATERAL, COMBINED  2008    breast cancer     RECONSTRUCT BREAST Left 2015    Procedure: RECONSTRUCT BREAST;  Surgeon: Jose Luis Hawkins MD;  Location: [facility]     REVISE RECONSTRUCTED BREAST  2012    Procedure:REVISE RECONSTRUCTED BREAST; LEFT BREAST CAPSULORRAPHY; Surgeon:JOSE LUIS HAWKINS; Location:[facility]     TOE SURGERY           Family Medical History:     Family History   Problem Relation Age of Onset     Hypertension Father          Social History:     Social History     Socioeconomic History     Marital status:      Spouse name: Not on file     Number of children: 1     Years of education: Not on file     Highest education level: Not on file   Occupational History     Occupation: works dental lab as    Tobacco Use     Smoking status: Former Smoker     Years: 20.00     Quit date: 2005     Years since quittin.3     Smokeless tobacco: Never Used   Substance and Sexual Activity     Alcohol use: Yes     Alcohol/week: 0.0 standard drinks     Comment: 3-4 drinks monthly     Drug use: No     Sexual activity: Yes     Partners: Male   Other Topics Concern     Parent/sibling w/ CABG, MI or angioplasty before 65F 55M? Not Asked   Social History Narrative     Not on file     Social Determinants of Health     Financial Resource Strain: Not on file   Food Insecurity: Not on file   Transportation Needs: Not on file   Physical Activity: Not on file   Stress: Not on file   Social Connections: Not on file   Intimate  "Partner Violence: Not on file   Housing Stability: Not on file           Review of System:     Constitutional: Negative for fever or chills  Skin: Negative for rashes  Ears/Nose/Throat: Negative for nasal congestion, sore throat  Respiratory: No shortness of breath, dyspnea on exertion, cough, or hemoptysis  Cardiovascular: Negative for chest pain  Gastrointestinal: Negative for nausea, vomiting  Genitourinary: Negative for dysuria, hematuria  Musculoskeletal: Negative for myalgias  Neurologic: Negative for headaches  Psychiatric: Negative for depression, anxiety  Hematologic/Lymphatic/Immunologic: Negative  Endocrine: Negative  Behavioral: Negative for tobacco use       Physical Exam:   BP (!) 154/96 (BP Location: Left arm, Patient Position: Sitting, Cuff Size: Adult Regular)   Pulse 61   Temp 97.7  F (36.5  C) (Temporal)   Resp 16   Ht 1.626 m (5' 4\")   Wt 76.7 kg (169 lb)   SpO2 95%   BMI 29.01 kg/m      GENERAL: alert and no distress  EYES: eyes grossly normal to inspection, and conjunctivae and sclerae normal  HENT: Normocephalic atraumatic. Nose and mouth without ulcers or lesions, left ear hearing loss symptoms noted  NECK: supple  RESP: lungs clear to auscultation   CV: regular rate and rhythm, normal S1 S2  LYMPH: no peripheral edema   ABDOMEN: nondistended  MS: no gross musculoskeletal defects noted  SKIN: no suspicious lesions or rashes  NEURO: Alert & Oriented x 3.   PSYCH: mentation appears normal, affect normal        Diagnostic Test Results:     Diagnostic Test Results:  Labs reviewed in Epic    ASSESSMENT/PLAN:       Suni was seen today for pre-op exam.    Diagnoses and all orders for this visit:    Primary hypertension  -     amLODIPine (NORVASC) 5 MG tablet; Take 1 tablet (5 mg) by mouth daily    Screen for colon cancer  -     Adult Gastro Ref - Procedure Only; Future    Hyperlipidemia LDL goal <100  - continue current therapy including diet, exercise    Asymmetrical sensorineural hearing " loss  - follow up with ENT clinic     Other orders  -     Hemoglobin; Future  -     REVIEW OF HEALTH MAINTENANCE PROTOCOL ORDERS            Follow Up Plan:     Patient is instructed to return to Internal Medicine clinic for follow-up visit in 1 week.        Leslee Mccracken MD  Internal Medicine  [facility]

## 2022-05-31 ENCOUNTER — HOSPITAL ENCOUNTER (OUTPATIENT)
Facility: CLINIC | Age: 72
End: 2022-05-31
Attending: COLON & RECTAL SURGERY | Admitting: COLON & RECTAL SURGERY
Payer: MEDICARE

## 2022-05-31 ENCOUNTER — TELEPHONE (OUTPATIENT)
Dept: GASTROENTEROLOGY | Facility: CLINIC | Age: 72
End: 2022-05-31
Payer: MEDICARE

## 2022-05-31 NOTE — TELEPHONE ENCOUNTER
Screening Questions  BlueKIND OF PREP RedLOCATION [review exclusion criteria] GreenSEDATION TYPE  1. Have you had a positive covid test in the last 90 days? n     2. Do you have a legal guardian or medical Power of ?  Are you able to give consent for your medical care?Y (Sedation review/consideration needed)    3. Are you active on mychart? Y    4. What insurance is in the chart? MEDICARE      3.   Ordering/Referring Provider: Leslee Mccracken MD    4. BMI 29.0 [BMI OVER 40-EXTENDED PREP]  If greater than 40 review exclusion criteria [PAC APPT IF @ [facility]]      5.  Respiratory Screening :  [If yes to any of the following HOSPITAL setting only]     Do you use daily home oxygen? N    Do you have mod to severe Obstructive Sleep Apnea? N  [OKAY @ [facility] SH PH RI]   Do you have Pulmonary Hypertension? N     Do you have UNCONTROLLED asthma? N        6.   Have you had a heart or lung transplant? N      7.   Are you currently on dialysis? N [ If yes, G-PREP & HOSPITAL setting only]     8.   Do you have chronic kidney disease? N [ If yes, G-PREP ]    9.   Have you had a stroke or Transient ischemic attack (TIA - aka  mini stroke ) within 6 months?  N (If yes, please review exclusion criteria)    10.   In the past 6 months, have you had any heart related issues including cardiomyopathy or heart attack? N           If yes, did it require cardiac stenting or other implantable device? N      11.   Do you have any implantable devices in your body (pacemaker, defib, LVAD)? N (If yes, please review exclusion criteria)    12.   Do you take nitroglycerin? N           If yes, how often? N  (if yes, HOSPITAL setting ONLY)    13.   Are you currently taking any blood thinners? N           [IF YES, INFORM PATIENT TO FOLLOW UP W/ ORDERING PROVIDER FOR BRIDGING INSTRUCTIONS]     14.   Do you have a diagnosis of diabetes? N   [ If yes, G-PREP ]    15.   [FEMALES] Are you currently pregnant?   N  If yes, how many  weeks? N    16.   Are you taking any prescription pain medications on a routine schedule?  N  [ If yes, EXTENDED PREP.] [If yes, MAC]    17.   Do you have any chemical dependencies such as alcohol, street drugs, or methadone?  N [If yes, MAC]    18.   Do you have any history of post-traumatic stress syndrome, severe anxiety or history of psychosis?  N  [If yes, MAC]    19.   Do you transfer independently?  Y    20.  On a regular basis do you go 3-5 days between bowel movements? N   [ If yes, EXTENDED PREP.]    21.   Preferred LOCAL Pharmacy for Pre Prescription      [id number] [address]        Scheduling Details      Caller :  u[employer]ni   (Please ask for phone number if not scheduled by patient)    Type of Procedure Scheduled: Lower Endoscopy [Colonoscopy]  Which Colonoscopy Prep was Sent?: ED GRAMA[facility] CF PATIENTS & GROEN'S PATIENTS NEEDS EXTENDED PREP  Surgeon: ANNE MARIE  Date of Procedure: 7/18  Location:     Sedation Type: CS    Conscious Sedation- Needs  for 6 hours after the procedure  MAC/General-Needs  for 24 hours after procedure    Pre-op Required at [facility], [place], [place] and OR for MAC sedation: N  (advise patient they will need a pre-op prior to procedure -)      Informed patient they will need an adult  Y  Cannot take any type of public or medical transportation alone    Pre-Procedure [facility] or Externally: 7/14    Confirmed Nurse will call to complete assessment Y    Additional comments:

## 2022-06-02 ENCOUNTER — OFFICE VISIT (OUTPATIENT)
Dept: FAMILY MEDICINE | Facility: CLINIC | Age: 72
End: 2022-06-02
Payer: MEDICARE

## 2022-06-02 VITALS
SYSTOLIC BLOOD PRESSURE: 138 MMHG | WEIGHT: 169 LBS | HEART RATE: 60 BPM | BODY MASS INDEX: 28.85 KG/M2 | RESPIRATION RATE: 16 BRPM | TEMPERATURE: 97.7 F | DIASTOLIC BLOOD PRESSURE: 82 MMHG | OXYGEN SATURATION: 96 % | HEIGHT: 64 IN

## 2022-06-02 DIAGNOSIS — Z01.810 PRE-OPERATIVE CARDIOVASCULAR EXAMINATION: Primary | ICD-10-CM

## 2022-06-02 DIAGNOSIS — G56.03 BILATERAL CARPAL TUNNEL SYNDROME: ICD-10-CM

## 2022-06-02 PROCEDURE — 99214 OFFICE O/P EST MOD 30 MIN: CPT | Performed by: INTERNAL MEDICINE

## 2022-06-02 ASSESSMENT — PAIN SCALES - GENERAL: PAINLEVEL: NO PAIN (0)

## 2022-06-02 NOTE — PROGRESS NOTES
[address], [address]  [facility] [address]  Phone: [phone]  Primary Provider: Akil Pal  Pre-op Performing Provider: SAL JULES      PREOPERATIVE EVALUATION:  Today's date: 6/2/2022    Suni Avila is a 71 year old female who presents for a preoperative evaluation.    Surgical Information:  Surgery/Procedure: Carpal tunnel  Surgery Location: [facility]  Surgeon:   Surgery Date: 06/10/2022  Time of Surgery: TBD  Where patient plans to recover: At home with family  Fax number for surgical facility:     Type of Anesthesia Anticipated: to be determined     Assessment & Plan     The proposed surgical procedure is considered INTERMEDIATE risk.    Pre-operative cardiovascular examination  Bilateral carpal tunnel syndrome      Risks and Recommendations:  The patient has the following additional risks and recommendations for perioperative complications:   - No identified additional risk factors other than previously addressed    Medication Instructions:  Patient is to take all scheduled medications on the day of surgery    RECOMMENDATION:  APPROVAL GIVEN to proceed with proposed procedure, without further diagnostic evaluation.        Subjective     HPI related to upcoming procedure: patient Suni Avila is a very pleasant 71 year old female with bilateral carpal tunnel who presents to internal medicine clinic for a pre op cardiac evaluation for upcoming bilateral carpal tunnel surgeries. No chest pain, headaches, fever or chills. No known allergies to anesthesia agents.     Preop Questions 5/27/2022   1. Have you ever had a heart attack or stroke? No   2. Have you ever had surgery on your heart or blood vessels, such as a stent placement, a coronary artery bypass, or surgery on an artery in your head, neck, heart, or legs? No   3. Do you have chest pain with activity? No   4. Do you have a history of  heart failure? No   5. Do you currently have a cold,  bronchitis or symptoms of other infection? No   6. Do you have a cough, shortness of breath, or wheezing? No   7. Do you or anyone in your family have previous history of blood clots? No   8. Do you or does anyone in your family have a serious bleeding problem such as prolonged bleeding following surgeries or cuts? No   9. Have you ever had problems with anemia or been told to take iron pills? No   10. Have you had any abnormal blood loss such as black, tarry or bloody stools, or abnormal vaginal bleeding? No   11. Have you ever had a blood transfusion? No   12. Are you willing to have a blood transfusion if it is medically needed before, during, or after your surgery? Yes   13. Have you or any of your relatives ever had problems with anesthesia? No   14. Do you have sleep apnea, excessive snoring or daytime drowsiness? No   15. Do you have any artifical heart valves or other implanted medical devices like a pacemaker, defibrillator, or continuous glucose monitor? No   16. Do you have artificial joints? YES    17. Are you allergic to latex? YES   18. Is there any chance that you may be pregnant? -       Health Care Directive:  Patient does not have a Health Care Directive or Living Will: Discussed advance care planning with patient; information given to patient to review.        Review of Systems  Constitutional, neuro, ENT, endocrine, pulmonary, cardiac, gastrointestinal, genitourinary, musculoskeletal, integument and psychiatric systems are negative, except as otherwise noted.    Patient Active Problem List    Diagnosis Date Noted     Hyperlipidemia LDL goal <100 12/12/2019     Priority: Medium     Abnormal cardiovascular stress test 11/19/2019     Priority: Medium     Added automatically from request for surgery [id number]       S/P left unicompartmental knee replacement 05/24/2016     Priority: Medium     History of arthroplasty of right knee 05/24/2016     Priority: Medium     S/P total knee replacement 09/13/2012      Priority: Medium     Advanced directives, counseling/discussion 05/03/2012     Priority: Medium     Patient states has Advance Directive and will bring in a copy to clinic. 5/3/2012          Colonic polyp 03/01/2012     Priority: Medium     [place], tubular adenoma       Breast cancer ([facility])      Priority: Medium     bilat mast and reconstruction, Dr. Cuevas        Past Medical History:   Diagnosis Date     Chest pain 2019    pos est, then angiogram done and no cad     Colonic polyp 03/2012    [place] [facility], tubular adenoma, fu 2017     DCIS (ductal carcinoma in situ) of breast 2004    had xrt     History of breast cancer 2007    left, bilat mast and reconstruction, Dr. Cuevas     Hyperlipidemia LDL goal <100 12/12/2019     Past Surgical History:   Procedure Laterality Date     APPENDECTOMY  2002     ARTHROPLASTY KNEE UNICOMPARTMENT  9/13/2012    Procedure: ARTHROPLASTY KNEE UNICOMPARTMENT;  RIGHT KNEE UNICOMPARTMENTAL ARTHROPLASTY ;  Surgeon: Dakota Haines MD;  Location:  OR     ARTHROPLASTY KNEE UNICOMPARTMENT Left 5/24/2016    Procedure: ARTHROPLASTY KNEE UNICOMPARTMENT;  Surgeon: Dakota Haines MD;  Location:  OR     BREAST RECONSTRUCTION WITH DEEP INFERIOR EPIGASTRIC  (ABRAHAM) FLAP,  2009     bunion surgery  1975 and 2007     BUNIONECTOMY IVETH  5/9/2012    Procedure:BUNIONECTOMY IVETH; RIGHT HALLUX VALGUS WITH SECOND CLAWTOE RECONSTRUCTION; Surgeon:PHONG HENDRICKSON; Location: OR     CV CORONARY ANGIOGRAM N/A 12/6/2019    Procedure: Coronary Angiogram;  Surgeon: Jose Luis Montero MD;  Location:  HEART CARDI[facility] CATH LAB     EXCISE LESION TRUNK N/A 11/13/2015    Procedure: EXCISE LESION TRUNK;  Surgeon: Jose Luis Hawkins MD;  Location:  [facility]     FOOT SURGERY  08/2017    at [facility]     fractured arm  2009     MASTECTOMY BILATERAL, INSERT TISSUE EXPANDER BILATERAL, COMBINED  2008    breast cancer     RECONSTRUCT BREAST Left 11/13/2015    Procedure: RECONSTRUCT BREAST;  Surgeon:  Jose Luis Hawkins MD;  Location: [facility]     REVISE RECONSTRUCTED BREAST  2012    Procedure:REVISE RECONSTRUCTED BREAST; LEFT BREAST CAPSULORRAPHY; Surgeon:JOSE LUIS HAWKINS; Location:[facility]     TOE SURGERY       Current Outpatient Medications   Medication Sig Dispense Refill     albuterol (PROAIR HFA) 108 (90 Base) MCG/ACT inhaler Inhale 2 puffs into the lungs every 4 hours as needed for shortness of breath / dyspnea 8 g 0     Multiple Vitamins-Minerals (MULTIVITAMIN GUMMIES ADULTS) CHEW Take 2 chew tab by mouth daily       predniSONE (DELTASONE) 20 MG tablet Take two tablets (= 40mg) each day for 4 days 10 tablet 0       Allergies   Allergen Reactions     No Known Drug Allergy      Latex Rash        Social History     Tobacco Use     Smoking status: Former Smoker     Years: 20.00     Quit date: 2005     Years since quittin.3     Smokeless tobacco: Never Used   Substance Use Topics     Alcohol use: Yes     Alcohol/week: 0.0 standard drinks     Comment: 3-4 drinks monthly     Family History   Problem Relation Age of Onset     Hypertension Father      History   Drug Use No         Objective     Vitals: B/P: 138/82, T: 97.7, P: 60, R: 16    Physical Exam    GENERAL APPEARANCE: alert and no distress     EYES: EOMI, PERRL     HENT: ear canals and TM's normal and nose and mouth without ulcers or lesions     NECK: no adenopathy, no asymmetry, masses, or scars and thyroid normal to palpation     RESP: lungs clear to auscultation - no rales, rhonchi or wheezes     CV: regular rates and rhythm, normal S1 S2, no S3 or S4 and no murmur, click or rub     ABDOMEN:  soft, nontender, no HSM or masses and bowel sounds normal     MS: bilateral carpal tunnel symptoms noted     SKIN: no suspicious lesions or rashes     NEURO: Normal strength and tone, sensory exam grossly normal, mentation intact and speech normal     PSYCH: mentation appears normal. and affect normal/bright     LYMPHATICS: No cervical  adenopathy    Recent Labs   Lab Test 09/04/21 2059   HGB 13.8         POTASSIUM 3.5   CR 0.88             Signed Electronically by: Leslee Mccracken MD  Copy of this evaluation report is provided to requesting physician.

## 2022-07-06 ENCOUNTER — TELEPHONE (OUTPATIENT)
Dept: GASTROENTEROLOGY | Facility: CLINIC | Age: 72
End: 2022-07-06

## 2022-07-06 NOTE — TELEPHONE ENCOUNTER
Caller: Suni Avila      Procedure: colon    Date, Location, and Surgeon of Procedure Cancelled: 7/18/22,     Ordering Provider:JORGE A    Reason for cancel (please be detailed, any staff messages or encounters to note?): PT REQUEST,  IN HOSPITAL        Rescheduled: YES     If rescheduled:    Date: 8/15/22   Location:    Prep Resent: MPREP(changes to prep?)   Covid Test Rescheduled: HOME TEST   Note any change or update to original order/sedation:

## 2022-08-10 ENCOUNTER — TELEPHONE (OUTPATIENT)
Dept: GASTROENTEROLOGY | Facility: CLINIC | Age: 72
End: 2022-08-10

## 2022-08-10 NOTE — TELEPHONE ENCOUNTER
Caller: sandra    Procedure: colon    Date, Location, and Surgeon of Procedure Cancelled: 8/15  purn[facility]    Ordering Provider:SAL JULES    Reason for cancel (please be detailed, any staff messages or encounters to note?): no         Rescheduled: y     If rescheduled:    Date: 10/3   Location:    Prep Resent: y(changes to prep?)   Covid Test Rescheduled: home   Note any change or update to original order/sedation:

## 2022-10-03 ENCOUNTER — HOSPITAL ENCOUNTER (OUTPATIENT)
Facility: CLINIC | Age: 72
Discharge: HOME | End: 2022-10-03
Attending: COLON & RECTAL SURGERY | Admitting: COLON & RECTAL SURGERY
Payer: MEDICARE

## 2022-10-03 VITALS
WEIGHT: 163 LBS | BODY MASS INDEX: 27.83 KG/M2 | DIASTOLIC BLOOD PRESSURE: 86 MMHG | SYSTOLIC BLOOD PRESSURE: 116 MMHG | HEIGHT: 64 IN | RESPIRATION RATE: 12 BRPM | HEART RATE: 54 BPM | OXYGEN SATURATION: 96 %

## 2022-10-03 DIAGNOSIS — Z12.11 ENCOUNTER FOR SCREENING COLONOSCOPY: Primary | ICD-10-CM

## 2022-10-03 LAB — COLONOSCOPY: NORMAL

## 2022-10-03 PROCEDURE — 45378 DIAGNOSTIC COLONOSCOPY: CPT | Performed by: COLON & RECTAL SURGERY

## 2022-10-03 PROCEDURE — 250N000011 HC RX IP 250 OP 636: Performed by: COLON & RECTAL SURGERY

## 2022-10-03 PROCEDURE — G0105 COLORECTAL SCRN; HI RISK IND: HCPCS | Performed by: COLON & RECTAL SURGERY

## 2022-10-03 PROCEDURE — G0500 MOD SEDAT ENDO SERVICE >5YRS: HCPCS | Performed by: COLON & RECTAL SURGERY

## 2022-10-03 RX ORDER — LIDOCAINE 40 MG/G
CREAM TOPICAL
Status: DISCONTINUED | OUTPATIENT
Start: 2022-10-03 | End: 2022-10-03 | Stop reason: HOSPADM

## 2022-10-03 RX ORDER — ONDANSETRON 2 MG/ML
4 INJECTION INTRAMUSCULAR; INTRAVENOUS
Status: DISCONTINUED | OUTPATIENT
Start: 2022-10-03 | End: 2022-10-03 | Stop reason: HOSPADM

## 2022-10-03 RX ORDER — FENTANYL CITRATE 50 UG/ML
INJECTION, SOLUTION INTRAMUSCULAR; INTRAVENOUS PRN
Status: COMPLETED | OUTPATIENT
Start: 2022-10-03 | End: 2022-10-03

## 2022-10-03 RX ADMIN — FENTANYL CITRATE 50 MCG: 50 INJECTION, SOLUTION INTRAMUSCULAR; INTRAVENOUS at 13:04

## 2022-10-03 RX ADMIN — MIDAZOLAM 2 MG: 1 INJECTION INTRAMUSCULAR; INTRAVENOUS at 13:01

## 2022-10-03 RX ADMIN — FENTANYL CITRATE 50 MCG: 50 INJECTION, SOLUTION INTRAMUSCULAR; INTRAVENOUS at 12:59

## 2022-10-03 ASSESSMENT — ACTIVITIES OF DAILY LIVING (ADL): ADLS_ACUITY_SCORE: 37

## 2022-10-15 ENCOUNTER — HEALTH MAINTENANCE LETTER (OUTPATIENT)
Age: 72
End: 2022-10-15

## 2022-10-17 ENCOUNTER — OFFICE VISIT (OUTPATIENT)
Dept: FAMILY MEDICINE | Facility: CLINIC | Age: 72
End: 2022-10-17
Payer: MEDICARE

## 2022-10-17 VITALS
SYSTOLIC BLOOD PRESSURE: 138 MMHG | WEIGHT: 170 LBS | OXYGEN SATURATION: 99 % | DIASTOLIC BLOOD PRESSURE: 76 MMHG | HEIGHT: 64 IN | BODY MASS INDEX: 29.02 KG/M2 | RESPIRATION RATE: 16 BRPM | TEMPERATURE: 97 F | HEART RATE: 54 BPM

## 2022-10-17 DIAGNOSIS — K63.5 POLYP OF COLON, UNSPECIFIED PART OF COLON, UNSPECIFIED TYPE: ICD-10-CM

## 2022-10-17 DIAGNOSIS — E78.5 HYPERLIPIDEMIA LDL GOAL <100: ICD-10-CM

## 2022-10-17 DIAGNOSIS — I10 PRIMARY HYPERTENSION: ICD-10-CM

## 2022-10-17 DIAGNOSIS — Z00.00 MEDICARE ANNUAL WELLNESS VISIT, SUBSEQUENT: ICD-10-CM

## 2022-10-17 DIAGNOSIS — M54.50 ACUTE LEFT-SIDED LOW BACK PAIN WITHOUT SCIATICA: ICD-10-CM

## 2022-10-17 DIAGNOSIS — C50.912 MALIGNANT NEOPLASM OF LEFT FEMALE BREAST, UNSPECIFIED ESTROGEN RECEPTOR STATUS, UNSPECIFIED SITE OF BREAST (H): ICD-10-CM

## 2022-10-17 DIAGNOSIS — Z23 NEED FOR VACCINATION: Primary | ICD-10-CM

## 2022-10-17 DIAGNOSIS — R93.89 ABNORMAL CT OF THE CHEST: ICD-10-CM

## 2022-10-17 DIAGNOSIS — L30.9 ECZEMA, UNSPECIFIED TYPE: ICD-10-CM

## 2022-10-17 LAB
ERYTHROCYTE [DISTWIDTH] IN BLOOD BY AUTOMATED COUNT: 13.1 % (ref 10–15)
HCT VFR BLD AUTO: 41.7 % (ref 35–47)
HGB BLD-MCNC: 13.7 G/DL (ref 11.7–15.7)
MCH RBC QN AUTO: 29.2 PG (ref 26.5–33)
MCHC RBC AUTO-ENTMCNC: 32.9 G/DL (ref 31.5–36.5)
MCV RBC AUTO: 89 FL (ref 78–100)
PLATELET # BLD AUTO: 262 10E3/UL (ref 150–450)
RBC # BLD AUTO: 4.69 10E6/UL (ref 3.8–5.2)
WBC # BLD AUTO: 7 10E3/UL (ref 4–11)

## 2022-10-17 PROCEDURE — 36415 COLL VENOUS BLD VENIPUNCTURE: CPT | Performed by: INTERNAL MEDICINE

## 2022-10-17 PROCEDURE — 90471 IMMUNIZATION ADMIN: CPT | Performed by: INTERNAL MEDICINE

## 2022-10-17 PROCEDURE — 85027 COMPLETE CBC AUTOMATED: CPT | Performed by: INTERNAL MEDICINE

## 2022-10-17 PROCEDURE — 80061 LIPID PANEL: CPT | Performed by: INTERNAL MEDICINE

## 2022-10-17 PROCEDURE — 80053 COMPREHEN METABOLIC PANEL: CPT | Performed by: INTERNAL MEDICINE

## 2022-10-17 PROCEDURE — G0439 PPPS, SUBSEQ VISIT: HCPCS | Performed by: INTERNAL MEDICINE

## 2022-10-17 PROCEDURE — 99214 OFFICE O/P EST MOD 30 MIN: CPT | Mod: 25 | Performed by: INTERNAL MEDICINE

## 2022-10-17 PROCEDURE — 90714 TD VACC NO PRESV 7 YRS+ IM: CPT | Performed by: INTERNAL MEDICINE

## 2022-10-17 RX ORDER — AMLODIPINE BESYLATE 5 MG/1
5 TABLET ORAL DAILY
Qty: 90 TABLET | Refills: 3 | Status: SHIPPED | OUTPATIENT
Start: 2022-10-17 | End: 2023-10-05

## 2022-10-17 RX ORDER — CLOBETASOL PROPIONATE 0.5 MG/G
CREAM TOPICAL 2 TIMES DAILY
Qty: 15 G | Refills: 0 | Status: SHIPPED | OUTPATIENT
Start: 2022-10-17 | End: 2023-10-05

## 2022-10-17 ASSESSMENT — ENCOUNTER SYMPTOMS
BREAST MASS: 0
ARTHRALGIAS: 1

## 2022-10-17 ASSESSMENT — ACTIVITIES OF DAILY LIVING (ADL): CURRENT_FUNCTION: HOUSEWORK REQUIRES ASSISTANCE

## 2022-10-17 ASSESSMENT — PAIN SCALES - GENERAL: PAINLEVEL: EXTREME PAIN (8)

## 2022-10-17 NOTE — NURSING NOTE
Prior to immunization administration, verified patients identity using patient s name and date of birth. Please see Immunization Activity for additional information.     Screening Questionnaire for Adult Immunization    Are you sick today?   No   Do you have allergies to medications, food, a vaccine component or latex?   No   Have you ever had a serious reaction after receiving a vaccination?   No   Do you have a long-term health problem with heart, lung, kidney, or metabolic disease (e.g., diabetes), asthma, a blood disorder, no spleen, complement component deficiency, a cochlear implant, or a spinal fluid leak?  Are you on long-term aspirin therapy?   No   Do you have cancer, leukemia, HIV/AIDS, or any other immune system problem?   No   Do you have a parent, brother, or sister with an immune system problem?   No   In the past 3 months, have you taken medications that affect  your immune system, such as prednisone, other steroids, or anticancer drugs; drugs for the treatment of rheumatoid arthritis, Crohn s disease, or psoriasis; or have you had radiation treatments?   No   Have you had a seizure, or a brain or other nervous system problem?   No   During the past year, have you received a transfusion of blood or blood    products, or been given immune (gamma) globulin or antiviral drug?   No   For women: Are you pregnant or is there a chance you could become       pregnant during the next month?   No   Have you received any vaccinations in the past 4 weeks?   No     Immunization questionnaire answers were all negative.        Per orders of Dr. Pal, injection of TD given by Tawnya Tadeo CMA. Patient instructed to remain in clinic for 15 minutes afterwards, and to report any adverse reaction to me immediately.       Screening performed by Tawnya Tadeo CMA on 10/17/2022 at 1:44 PM.

## 2022-10-17 NOTE — LETTER
[place] [address] [facility]  [address]  [place]  [address]  Tel: [phone]    October 18, 2022    Suni Avila  [address]   [facility] [address]        Dear Ms. Avila,        It was a pleasure seeing you for your physical examination.  I wanted to get back to you with your test results.  I have enclosed a copy for your review.     I am happy to report that your cbc or complete blood count is normal with no signs of anemia, leukemia or platelet abnormalities. Your chemistry panel shows no diabetes.  Your blood salts, kidney tests, liver tests, and proteins are all fine.     Your total cholesterol is 208 with the normal range being below 200.  Your HDL or good cholesterol is wonderful aat 88 with the normal range being above 50.  Your LDL or bad cholesterol is 94 with the normal range being below 130.  These numbers are very good.     I am happy to bring you this overall excellent report.  If you have any questions please call me.     Akil Pal M.D.          Enclosure: Lab Results     Results for orders placed or performed in visit on 10/17/22   CBC with platelets     Status: Normal   Result Value Ref Range    WBC Count 7.0 4.0 - 11.0 10e3/uL    RBC Count 4.69 3.80 - 5.20 10e6/uL    Hemoglobin 13.7 11.7 - 15.7 g/dL    Hematocrit 41.7 35.0 - 47.0 %    MCV 89 78 - 100 fL    MCH 29.2 26.5 - 33.0 pg    MCHC 32.9 31.5 - 36.5 g/dL    RDW 13.1 10.0 - 15.0 %    Platelet Count 262 150 - 450 10e3/uL   Comprehensive metabolic panel     Status: Abnormal   Result Value Ref Range    Sodium 140 133 - 144 mmol/L    Potassium 4.0 3.4 - 5.3 mmol/L    Chloride 109 94 - 109 mmol/L    Carbon Dioxide (CO2) 27 20 - 32 mmol/L    Anion Gap 4 3 - 14 mmol/L    Urea Nitrogen 14 7 - 30 mg/dL    Creatinine 0.64 0.52 - 1.04 mg/dL    Calcium 9.3 8.5 - 10.1 mg/dL    Glucose 101 (H) 70 - 99 mg/dL    Alkaline Phosphatase 129 40 - 150 U/L    AST 22 0 - 45 U/L    ALT 29 0 - 50 U/L    Protein Total  7.1 6.8 - 8.8 g/dL    Albumin 4.1 3.4 - 5.0 g/dL    Bilirubin Total 0.6 0.2 - 1.3 mg/dL    GFR Estimate >90 >60 mL/min/1.73m2   Lipid panel reflex to direct LDL Fasting     Status: Abnormal   Result Value Ref Range    Cholesterol 208 (H) <200 mg/dL    Triglycerides 128 <150 mg/dL    Direct Measure HDL 88 >=50 mg/dL    LDL Cholesterol Calculated 94 <=100 mg/dL    Non HDL Cholesterol 120 <130 mg/dL    Patient Fasting > 8hrs? No     Narrative    Cholesterol  Desirable:  <200 mg/dL    Triglycerides  Normal:  Less than 150 mg/dL  Borderline High:  150-199 mg/dL  High:  200-499 mg/dL  Very High:  Greater than or equal to 500 mg/dL    Direct Measure HDL  Female:  Greater than or equal to 50 mg/dL   Male:  Greater than or equal to 40 mg/dL    LDL Cholesterol  Desirable:  <100mg/dL  Above Desirable:  100-129 mg/dL   Borderline High:  130-159 mg/dL   High:  160-189 mg/dL   Very High:  >= 190 mg/dL    Non HDL Cholesterol  Desirable:  130 mg/dL  Above Desirable:  130-159 mg/dL  Borderline High:  160-189 mg/dL  High:  190-219 mg/dL  Very High:  Greater than or equal to 220 mg/dL

## 2022-10-17 NOTE — NURSING NOTE
"Suni Avila is a 72 year old patient who comes in today for a Blood Pressure check.  Initial BP:  BP (!) 156/83 (BP Location: Left arm, Patient Position: Chair, Cuff Size: Adult Large)   Pulse 53   Temp 97  F (36.1  C) (Tympanic)   Resp 16   Ht 1.626 m (5' 4\")   Wt 77.1 kg (170 lb)   SpO2 99%   BMI 29.18 kg/m       53  Disposition: provider notified while patient in the clinic  Tawnya Schreiber CMA      "

## 2022-10-17 NOTE — PROGRESS NOTES
SUBJECTIVE:   Suni is a 72 year old who presents for Preventive Visit.    Overall she is doing well but not exercising a lot, she does walk her dog some.  Her 's had health issues recently so that has been difficult.  She has a history of breast cancer without recurrence.  She is up-to-date on her colon exam.  She has hypertension and her blood pressure at home has been quite good.  She has had left-sided low back pain for which she sees a chiropractor and has been relatively stable.    She did have shortness of breath 1 year ago and had a CT of her chest with some abnormalities and I will get follow-up for that.    She has had some eczema of the vulvar area and is given clobetasol by her gynecologist which works quite well.  She does not have to use it often and requests a refill.               Past Medical History:      Past Medical History:   Diagnosis Date     Chest pain 2019    pos est, then angiogram done and no cad     Colonic polyp 03/2012    [place] [facility], tubular adenoma, fu 2017, fu 10/22 nl     DCIS (ductal carcinoma in situ) of breast 2004    had xrt     History of breast cancer 2007    left, bilat mast and reconstruction, Dr. Cuevas     Hyperlipidemia LDL goal <100 12/12/2019     Hypertension 05/2022    norvasc added by Dr. Mccracken             Past Surgical History:      Past Surgical History:   Procedure Laterality Date     APPENDECTOMY  2002     ARTHROPLASTY KNEE UNICOMPARTMENT  09/13/2012    Procedure: ARTHROPLASTY KNEE UNICOMPARTMENT;  RIGHT KNEE UNICOMPARTMENTAL ARTHROPLASTY ;  Surgeon: Dakota Haines MD;  Location:  OR     ARTHROPLASTY KNEE UNICOMPARTMENT Left 05/24/2016    Procedure: ARTHROPLASTY KNEE UNICOMPARTMENT;  Surgeon: Dakota Haines MD;  Location:  OR     BREAST RECONSTRUCTION WITH DEEP INFERIOR EPIGASTRIC  (ABRAHAM) FLAP,  2009     bunion surgery  1975 and 2007     BUNIONECTOMY IVETH  05/09/2012    Procedure:BUNIONECTOMY IVETH; RIGHT HALLUX VALGUS WITH SECOND  CLAWTOE RECONSTRUCTION; Surgeon:PHONG HENDRICKSON; Location: OR     CARPAL TUNNEL RELEASE RT/LT       COLONOSCOPY N/A 10/3/2022    Procedure: COLONOSCOPY;  Surgeon: Gabrielle Mack MD;  Location:  GI     CV CORONARY ANGIOGRAM N/A 2019    Procedure: Coronary Angiogram;  Surgeon: Kevin Montero MD;  Location:  [facility]ATH [facility]     EXCISE LESION TRUNK N/A 2015    Procedure: EXCISE LESION TRUNK;  Surgeon: Kevin Hawkins MD;  Location: [facility]     FOOT SURGERY  2017    at [facility]     fractured arm  2009     MASTECTOMY BILATERAL, INSERT TISSUE EXPANDER BILATERAL, COMBINED      breast cancer     RECONSTRUCT BREAST Left 2015    Procedure: RECONSTRUCT BREAST;  Surgeon: Kevin Hawkins MD;  Location: [facility]     REVISE RECONSTRUCTED BREAST  2012    Procedure:REVISE RECONSTRUCTED BREAST; LEFT BREAST CAPSULORRAPHY; Surgeon:KEVIN HAWKINS; Location:[facility]     SHOULDER SURGERY       TOE SURGERY               Social History:     Social History     Socioeconomic History     Marital status:      Spouse name: Not on file     Number of children: 1     Years of education: Not on file     Highest education level: Not on file   Occupational History     Occupation: works dental lab as    Tobacco Use     Smoking status: Former     Years: 20.00     Types: Cigarettes     Quit date: 2005     Years since quittin.7     Smokeless tobacco: Never   Substance and Sexual Activity     Alcohol use: Yes     Alcohol/week: 0.0 standard drinks     Comment: 3-4 drinks monthly     Drug use: No     Sexual activity: Yes     Partners: Male   Other Topics Concern     Parent/sibling w/ CABG, MI or angioplasty before 65F 55M? Not Asked   Social History Narrative     Not on file     Social Determinants of Health     Financial Resource Strain: Not on file   Food Insecurity: Not on file   Transportation Needs: Not on file   Physical Activity: Not on file  "  Stress: Not on file   Social Connections: Not on file   Intimate Partner Violence: Not on file   Housing Stability: Not on file             Family History:   reviewed         Allergies:     Allergies   Allergen Reactions     No Known Drug Allergy      Latex Rash             Medications:     Current Outpatient Medications   Medication Sig Dispense Refill     amLODIPine (NORVASC) 5 MG tablet Take 1 tablet (5 mg) by mouth daily 90 tablet 3     clobetasol (TEMOVATE) 0.05 % external cream Apply topically 2 times daily 15 g 0     Multiple Vitamins-Minerals (MULTIVITAMIN GUMMIES ADULTS) CHEW Take 2 chew tab by mouth daily                 Review of Systems:   The 10 point Review of Systems is negative other than noted in the HPI           Physical Exam:   Blood pressure 138/76, pulse 54, temperature 97  F (36.1  C), temperature source Tympanic, resp. rate 16, height 1.626 m (5' 4\"), weight 77.1 kg (170 lb), SpO2 99 %, not currently breastfeeding.    Exam:  Constitutional: healthy appearing, alert and in no distress  Heent: Normocephalic. Head without obvious masses or lesions. PERRLDC, EOMI. Mouth exam within normal limits: tongue, mucous membranes, posterior pharynx all normal, no lesions or abnormalities seen.  Tm's and canals within normal limits bilaterally. Neck supple, no nuchal rigidity or masses. No supraclavicular, or cervical adenopathy. Thyroid symmetric, no masses.  Cardiovascular: Regular rate and rhythm, no murmer, rub or gallops.  JVP not elevated, no edema.  Carotids within normal limits bilaterally, no bruits.  Respiratory: Normal respiratory effort.  Lungs clear, normal flow, no wheezing or crackles.  Gastrointestinal: Normal active bowel sounds.   Soft, not tender, no masses, guarding or rebound.  No hepatosplenomegaly.   Musculoskeletal: extremities normal, no gross deformities noted.  Skin: no suspicious lesions or rashes   Neurologic: Mental status within normal limits.  Speech fluent.  No gross " "motor abnormalities and gait intact.  Psychiatric: mentation appears normal and affect normal.         Data:   Labs sent        Assessment:   1. Normal complete physical exam  2. Ca breast, juan pablo  3. Elevated cholesterol follow up labs  4. Colon polyp, up to date o follow up  5. Abnormal ct chest, aneurysm, follow up olrdererd  6. Left low back pain, suspect msk, follow up chiro  7. Eczema, ok steroid cream but not often  8. Hypertension, controlled  9. hcm         Plan:   Letter with labs  Exercise, diet and weight loss  shingrix at pharm  Did not want covid shot  Up to date colon  Ct chest      Akil Pal M.D.                Patient has been advised of split billing requirements and indicates understanding: Yes  Are you in the first 12 months of your Medicare coverage?  No    Healthy Habits:     In general, how would you rate your overall health?  Good    Frequency of exercise:  None    Do you usually eat at least 4 servings of fruit and vegetables a day, include whole grains    & fiber and avoid regularly eating high fat or \"junk\" foods?  Yes    Taking medications regularly:  Yes    Medication side effects:  None    Ability to successfully perform activities of daily living:  Housework requires assistance    Home Safety:  No safety concerns identified    Hearing Impairment:  Difficulty following a conversation in a noisy restaurant or crowded room and need to ask people to speak up or repeat themselves    In the past 6 months, have you been bothered by leaking of urine? Yes    In general, how would you rate your overall mental or emotional health?  Excellent      PHQ-2 Total Score: 0    Additional concerns today:  Yes    Do you feel safe in your environment? Yes    Have you ever done Advance Care Planning? (For example, a Health Directive, POLST, or a discussion with a medical provider or your loved ones about your wishes): Yes, advance care planning is on file.       Fall risk  Fallen 2 or more times in the " past year?: No  Any fall with injury in the past year?: No    Cognitive Screening   1) Repeat 3 items (Leader, Season, Table)    2) Clock draw: NORMAL  3) 3 item recall: Recalls 3 objects  Results: 3 items recalled: COGNITIVE IMPAIRMENT LESS LIKELY    Mini-CogTM Copyright [employer] [facility]. Licensed by the author for use in [facility]; reprinted with permission (kayce@[facility]). All rights reserved.      Do you have sleep apnea, excessive snoring or daytime drowsiness?: no    Reviewed and updated as needed this visit by clinical staff                  Reviewed and updated as needed this visit by Provider                 Social History     Tobacco Use     Smoking status: Former     Years: 20.00     Types: Cigarettes     Quit date: 2005     Years since quittin.7     Smokeless tobacco: Never   Substance Use Topics     Alcohol use: Yes     Alcohol/week: 0.0 standard drinks     Comment: 3-4 drinks monthly     If you drink alcohol do you typically have >3 drinks per day or >7 drinks per week? No    Alcohol Use 10/29/2019   Prescreen: >3 drinks/day or >7 drinks/week? No               Current providers sharing in care for this patient include:   Patient Care Team:  Akil Pal MD as PCP - General (Internal Medicine)  Leslee Mccracken MD as Assigned PCP    The following health maintenance items are reviewed in Epic and correct as of today:  Health Maintenance   Topic Date Due     HEPATITIS B IMMUNIZATION (1 of 3 - 3-dose series) Never done     ZOSTER IMMUNIZATION (2 of 3) 2015     ADVANCE CARE PLANNING  2017     MEDICARE ANNUAL WELLNESS VISIT  10/29/2020     COVID-19 Vaccine (4 - Booster for Pfizer series) 2022     DTAP/TDAP/TD IMMUNIZATION (3 - Td or Tdap) 2022     INFLUENZA VACCINE (1) 2022     ANNUAL REVIEW OF HM ORDERS  2023     FALL RISK ASSESSMENT  2023     LIPID  10/29/2024     DEXA  10/08/2029     COLORECTAL CANCER SCREENING  10/03/2032     HEPATITIS  "C SCREENING  Completed     PHQ-2 (once per calendar year)  Completed     Pneumococcal Vaccine: 65+ Years  Completed     IPV IMMUNIZATION  Aged Out     MENINGITIS IMMUNIZATION  Aged Out               Review of Systems      OBJECTIVE:   There were no vitals taken for this visit. Estimated body mass index is 27.98 kg/m  as calculated from the following:    Height as of 10/3/22: 1.626 m (5' 4\").    Weight as of 10/3/22: 73.9 kg (163 lb).  Physical Exam          ASSESSMENT / PLAN:           COUNSELING:  Reviewed preventive health counseling, as reflected in patient instructions       Regular exercise       Healthy diet/nutrition    Estimated body mass index is 27.98 kg/m  as calculated from the following:    Height as of 10/3/22: 1.626 m (5' 4\").    Weight as of 10/3/22: 73.9 kg (163 lb).    Weight management plan: Discussed healthy diet and exercise guidelines    She reports that she quit smoking about 17 years ago. She has never used smokeless tobacco.      Appropriate preventive services were discussed with this patient, including applicable screening as appropriate for cardiovascular disease, diabetes, osteopenia/osteoporosis, and glaucoma.  As appropriate for age/gender, discussed screening for colorectal cancer, prostate cancer, breast cancer, and cervical cancer. Checklist reviewing preventive services available has been given to the patient.    Reviewed patients plan of care and provided an AVS. The Basic Care Plan (routine screening as documented in Health Maintenance) for Suni meets the Care Plan requirement. This Care Plan has been established and reviewed with the Patient.    Counseling Resources:  ATP IV Guidelines  Pooled Cohorts Equation Calculator  Breast Cancer Risk Calculator  Breast Cancer: Medication to Reduce Risk  FRAX Risk Assessment  ICSI Preventive Guidelines  Dietary Guidelines for Americans, 2010  [employer]'s MyPlate  ASA Prophylaxis  Lung CA Screening    Akil Pal MD  [facility] " JENNIFER    Identified Health Risks:

## 2022-10-18 LAB
ALBUMIN SERPL-MCNC: 4.1 G/DL (ref 3.4–5)
ALP SERPL-CCNC: 129 U/L (ref 40–150)
ALT SERPL W P-5'-P-CCNC: 29 U/L (ref 0–50)
ANION GAP SERPL CALCULATED.3IONS-SCNC: 4 MMOL/L (ref 3–14)
AST SERPL W P-5'-P-CCNC: 22 U/L (ref 0–45)
BILIRUB SERPL-MCNC: 0.6 MG/DL (ref 0.2–1.3)
BUN SERPL-MCNC: 14 MG/DL (ref 7–30)
CALCIUM SERPL-MCNC: 9.3 MG/DL (ref 8.5–10.1)
CHLORIDE BLD-SCNC: 109 MMOL/L (ref 94–109)
CHOLEST SERPL-MCNC: 208 MG/DL
CO2 SERPL-SCNC: 27 MMOL/L (ref 20–32)
CREAT SERPL-MCNC: 0.64 MG/DL (ref 0.52–1.04)
FASTING STATUS PATIENT QL REPORTED: NO
GFR SERPL CREATININE-BSD FRML MDRD: >90 ML/MIN/1.73M2
GLUCOSE BLD-MCNC: 101 MG/DL (ref 70–99)
HDLC SERPL-MCNC: 88 MG/DL
LDLC SERPL CALC-MCNC: 94 MG/DL
NONHDLC SERPL-MCNC: 120 MG/DL
POTASSIUM BLD-SCNC: 4 MMOL/L (ref 3.4–5.3)
PROT SERPL-MCNC: 7.1 G/DL (ref 6.8–8.8)
SODIUM SERPL-SCNC: 140 MMOL/L (ref 133–144)
TRIGL SERPL-MCNC: 128 MG/DL

## 2022-10-18 NOTE — RESULT ENCOUNTER NOTE
It was a pleasure seeing you for your physical examination.  I wanted to get back to you with your test results.  I have enclosed a copy for your review.     I am happy to report that your cbc or complete blood count is normal with no signs of anemia, leukemia or platelet abnormalities. Your chemistry panel shows no diabetes.  Your blood salts, kidney tests, liver tests, and proteins are all fine.    Your total cholesterol is 208 with the normal range being below 200.  Your HDL or good cholesterol is wonderful aat 88 with the normal range being above 50.  Your LDL or bad cholesterol is 94 with the normal range being below 130.  These numbers are very good.    I am happy to bring you this overall excellent report.  If you have any questions please call me.    Akil Pal M.D.

## 2022-10-31 ENCOUNTER — HOSPITAL ENCOUNTER (OUTPATIENT)
Dept: CT IMAGING | Facility: CLINIC | Age: 72
Discharge: HOME | End: 2022-10-31
Attending: INTERNAL MEDICINE | Admitting: INTERNAL MEDICINE
Payer: MEDICARE

## 2022-10-31 DIAGNOSIS — R93.89 ABNORMAL CT OF THE CHEST: ICD-10-CM

## 2022-10-31 PROCEDURE — G1010 CDSM STANSON: HCPCS

## 2022-10-31 PROCEDURE — 250N000011 HC RX IP 250 OP 636: Performed by: RADIOLOGY

## 2022-10-31 PROCEDURE — 250N000009 HC RX 250: Performed by: RADIOLOGY

## 2022-10-31 RX ORDER — IOPAMIDOL 755 MG/ML
500 INJECTION, SOLUTION INTRAVASCULAR ONCE
Status: COMPLETED | OUTPATIENT
Start: 2022-10-31 | End: 2022-10-31

## 2022-10-31 RX ADMIN — SODIUM CHLORIDE 58 ML: 9 INJECTION, SOLUTION INTRAVENOUS at 08:38

## 2022-10-31 RX ADMIN — IOPAMIDOL 78 ML: 755 INJECTION, SOLUTION INTRAVENOUS at 08:38

## 2022-11-01 PROBLEM — I77.810 ASCENDING AORTA DILATION (H): Status: ACTIVE | Noted: 2021-09-01

## 2022-11-01 PROBLEM — R93.89 ABNORMAL CT OF THE CHEST: Status: ACTIVE | Noted: 2021-09-01

## 2022-11-01 NOTE — RESULT ENCOUNTER NOTE
I am happy to report that your ct scan is very stable.  There is some slightly enlarged lymphnodes but this is not a concern.  I will want to follow up and repeat this in one year.    Let me know if you have questions.    Akil Pal M.D.

## 2023-01-20 ENCOUNTER — TRANSFERRED RECORDS (OUTPATIENT)
Dept: HEALTH INFORMATION MANAGEMENT | Facility: CLINIC | Age: 73
End: 2023-01-20

## 2023-01-20 LAB — RETINOPATHY: NORMAL

## 2023-08-30 ENCOUNTER — TRANSFERRED RECORDS (OUTPATIENT)
Dept: HEALTH INFORMATION MANAGEMENT | Facility: CLINIC | Age: 73
End: 2023-08-30
Payer: COMMERCIAL

## 2023-10-05 ENCOUNTER — OFFICE VISIT (OUTPATIENT)
Dept: FAMILY MEDICINE | Facility: CLINIC | Age: 73
End: 2023-10-05
Payer: COMMERCIAL

## 2023-10-05 ENCOUNTER — HOSPITAL ENCOUNTER (OUTPATIENT)
Dept: BONE DENSITY | Facility: CLINIC | Age: 73
Discharge: HOME | End: 2023-10-05
Attending: INTERNAL MEDICINE | Admitting: INTERNAL MEDICINE
Payer: COMMERCIAL

## 2023-10-05 VITALS
TEMPERATURE: 97.7 F | HEIGHT: 64 IN | BODY MASS INDEX: 28.17 KG/M2 | SYSTOLIC BLOOD PRESSURE: 128 MMHG | WEIGHT: 165 LBS | HEART RATE: 63 BPM | OXYGEN SATURATION: 96 % | RESPIRATION RATE: 16 BRPM | DIASTOLIC BLOOD PRESSURE: 82 MMHG

## 2023-10-05 DIAGNOSIS — R93.89 ABNORMAL CT OF THE CHEST: ICD-10-CM

## 2023-10-05 DIAGNOSIS — Z78.0 ASYMPTOMATIC MENOPAUSE: ICD-10-CM

## 2023-10-05 DIAGNOSIS — H26.9 CATARACT, UNSPECIFIED CATARACT TYPE, UNSPECIFIED LATERALITY: ICD-10-CM

## 2023-10-05 DIAGNOSIS — C50.912 MALIGNANT NEOPLASM OF LEFT FEMALE BREAST, UNSPECIFIED ESTROGEN RECEPTOR STATUS, UNSPECIFIED SITE OF BREAST (H): ICD-10-CM

## 2023-10-05 DIAGNOSIS — Z01.818 PRE-OP EXAMINATION: Primary | ICD-10-CM

## 2023-10-05 DIAGNOSIS — I77.810 ASCENDING AORTA DILATION (H): ICD-10-CM

## 2023-10-05 DIAGNOSIS — R53.83 TIRED: ICD-10-CM

## 2023-10-05 DIAGNOSIS — L30.9 ECZEMA, UNSPECIFIED TYPE: ICD-10-CM

## 2023-10-05 DIAGNOSIS — I10 PRIMARY HYPERTENSION: ICD-10-CM

## 2023-10-05 PROBLEM — M85.851 OSTEOPENIA OF FEMORAL NECK, BILATERAL: Status: ACTIVE | Noted: 2023-10-01

## 2023-10-05 PROBLEM — R94.39 ABNORMAL CARDIOVASCULAR STRESS TEST: Status: RESOLVED | Noted: 2019-11-19 | Resolved: 2023-10-05

## 2023-10-05 PROBLEM — M85.852 OSTEOPENIA OF FEMORAL NECK, BILATERAL: Status: ACTIVE | Noted: 2023-10-01

## 2023-10-05 LAB
ALBUMIN SERPL BCG-MCNC: 4.1 G/DL (ref 3.5–5.2)
ALP SERPL-CCNC: 120 U/L (ref 35–104)
ALT SERPL W P-5'-P-CCNC: 16 U/L (ref 0–50)
ANION GAP SERPL CALCULATED.3IONS-SCNC: 10 MMOL/L (ref 7–15)
AST SERPL W P-5'-P-CCNC: 24 U/L (ref 0–45)
BILIRUB SERPL-MCNC: 0.5 MG/DL
BUN SERPL-MCNC: 19.1 MG/DL (ref 8–23)
CALCIUM SERPL-MCNC: 9.7 MG/DL (ref 8.8–10.2)
CHLORIDE SERPL-SCNC: 107 MMOL/L (ref 98–107)
CHOLEST SERPL-MCNC: 186 MG/DL
CREAT SERPL-MCNC: 0.7 MG/DL (ref 0.51–0.95)
DEPRECATED HCO3 PLAS-SCNC: 24 MMOL/L (ref 22–29)
EGFRCR SERPLBLD CKD-EPI 2021: >90 ML/MIN/1.73M2
ERYTHROCYTE [DISTWIDTH] IN BLOOD BY AUTOMATED COUNT: 12.9 % (ref 10–15)
GLUCOSE SERPL-MCNC: 93 MG/DL (ref 70–99)
HCT VFR BLD AUTO: 41.9 % (ref 35–47)
HDLC SERPL-MCNC: 80 MG/DL
HGB BLD-MCNC: 13.3 G/DL (ref 11.7–15.7)
LDLC SERPL CALC-MCNC: 93 MG/DL
MCH RBC QN AUTO: 28.3 PG (ref 26.5–33)
MCHC RBC AUTO-ENTMCNC: 31.7 G/DL (ref 31.5–36.5)
MCV RBC AUTO: 89 FL (ref 78–100)
NONHDLC SERPL-MCNC: 106 MG/DL
PLATELET # BLD AUTO: 240 10E3/UL (ref 150–450)
POTASSIUM SERPL-SCNC: 4.4 MMOL/L (ref 3.4–5.3)
PROT SERPL-MCNC: 6.6 G/DL (ref 6.4–8.3)
RBC # BLD AUTO: 4.7 10E6/UL (ref 3.8–5.2)
SODIUM SERPL-SCNC: 141 MMOL/L (ref 135–145)
TRIGL SERPL-MCNC: 66 MG/DL
TSH SERPL DL<=0.005 MIU/L-ACNC: 1.63 UIU/ML (ref 0.3–4.2)
WBC # BLD AUTO: 5.3 10E3/UL (ref 4–11)

## 2023-10-05 PROCEDURE — 80053 COMPREHEN METABOLIC PANEL: CPT | Performed by: INTERNAL MEDICINE

## 2023-10-05 PROCEDURE — 77080 DXA BONE DENSITY AXIAL: CPT

## 2023-10-05 PROCEDURE — 85027 COMPLETE CBC AUTOMATED: CPT | Performed by: INTERNAL MEDICINE

## 2023-10-05 PROCEDURE — 80061 LIPID PANEL: CPT | Performed by: INTERNAL MEDICINE

## 2023-10-05 PROCEDURE — 36415 COLL VENOUS BLD VENIPUNCTURE: CPT | Performed by: INTERNAL MEDICINE

## 2023-10-05 PROCEDURE — 84443 ASSAY THYROID STIM HORMONE: CPT | Performed by: INTERNAL MEDICINE

## 2023-10-05 PROCEDURE — 99214 OFFICE O/P EST MOD 30 MIN: CPT | Performed by: INTERNAL MEDICINE

## 2023-10-05 RX ORDER — AMLODIPINE BESYLATE 5 MG/1
5 TABLET ORAL DAILY
Qty: 90 TABLET | Refills: 3 | Status: SHIPPED | OUTPATIENT
Start: 2023-10-05

## 2023-10-05 RX ORDER — CLOBETASOL PROPIONATE 0.5 MG/G
CREAM TOPICAL 2 TIMES DAILY
Qty: 15 G | Refills: 0 | Status: SHIPPED | OUTPATIENT
Start: 2023-10-05

## 2023-10-05 ASSESSMENT — PAIN SCALES - GENERAL: PAINLEVEL: NO PAIN (0)

## 2023-10-05 NOTE — RESULT ENCOUNTER NOTE
It was nice to see you.  Your should be able to view the lab results.    Your CBC your blood count is normal.  Your thyroid test is normal.  Your cholesterol is excellent.    Your sugar test for diabetes is normal.  Your blood salts, kidney test, liver test and proteins are all fine.  The slightly elevated alkaline phosphatase is a liver test and I do not believe it is anything significant as it was even higher 2 years ago.    Please let me know if you have questions.    Akil Pal M.D.

## 2023-10-05 NOTE — PROGRESS NOTES
[address], [address]  [facility] [address]  Phone: [phone]  Primary Provider: Akil Pal  Pre-op Performing Provider: AKIL PAL      PREOPERATIVE EVALUATION:  Today's date: 10/5/2023    Suni is a 73 year old female who presents for a preoperative evaluation.      Surgical Information:  Surgery/Procedure: cataract surgery  Surgery Location: [facility] surgery center  Surgeon: Dr. Savage  Surgery Date: 10/18/2023  Time of Surgery: tbd  Where patient plans to recover: At home with family  Fax number for surgical facility:         Subjective       HPI related to upcoming procedure: This is a very pleasant 73-year-old who is having cataract surgery.  I been asked to see her in clear for the procedure.    She is doing quite well.  She has hypertension which is controlled.  She has a history of an ascending aortic dilatation as well as abnormal CT of the chest with follow-up 1 year ago that was stable.  She needs 1 more follow-up on this.  She has a history of breast cancer without evidence of disease.  She feels quite well and notes some fatigue but otherwise no complaints on review of systems.                Past Medical History:      Past Medical History:   Diagnosis Date    Abnormal CT of the chest 09/2021    fu 10/22 stable    Ascending aorta dilation (H24) 09/2021    on ct done for sob    Chest pain 2019    pos est, then angiogram done and no cad    Colonic polyp 03/2012    [facility] aden[place], fu 2017, fu 10/22 nl    DCIS (ductal carcinoma in situ) of breast 2004    had xrt    History of breast cancer 2007    left, bilat mast and reconstruction, Dr. Cuevas    Hyperlipidemia LDL goal <100 12/12/2019    Hypertension 05/2022    norvasc added by Dr. Mccracken             Past Surgical History:      Past Surgical History:   Procedure Laterality Date    APPENDECTOMY  2002    ARTHROPLASTY KNEE UNICOMPARTMENT  09/13/2012    Procedure: ARTHROPLASTY  KNEE UNICOMPARTMENT;  RIGHT KNEE UNICOMPARTMENTAL ARTHROPLASTY ;  Surgeon: Dakota Haines MD;  Location:  OR    ARTHROPLASTY KNEE UNICOMPARTMENT Left 2016    Procedure: ARTHROPLASTY KNEE UNICOMPARTMENT;  Surgeon: Dakota Haines MD;  Location:  OR    BREAST RECONSTRUCTION WITH DEEP INFERIOR EPIGASTRIC  (ABRAHAM) FLAP,      bunion surgery  1975 and 2007    BUNIONECTOMY IVETH  2012    Procedure:BUNIONECTOMY IVETH; RIGHT HALLUX VALGUS WITH SECOND CLAWTOE RECONSTRUCTION; Surgeon:PHONG HENDRICKSON; Location: OR    CARPAL TUNNEL RELEASE RT/LT      CATARACT EXTRACTION      COLONOSCOPY N/A 10/03/2022    Procedure: COLONOSCOPY;  Surgeon: Gabrielle Mack MD;  Location:  GI    CV CORONARY ANGIOGRAM N/A 2019    Procedure: Coronary Angiogram;  Surgeon: Kevin Montero MD;  Location:  HEART CARDIAC CATH LAB    EXCISE LESION TRUNK N/A 2015    Procedure: EXCISE LESION TRUNK;  Surgeon: Kevin Hawkins MD;  Location: [facility]    FOOT SURGERY  2017    at [facility]    fractured arm  2009    MASTECTOMY BILATERAL, INSERT TISSUE EXPANDER BILATERAL, COMBINED  2008    breast cancer    RECONSTRUCT BREAST Left 2015    Procedure: RECONSTRUCT BREAST;  Surgeon: Kevin Hawkins MD;  Location: [facility]    REVISE RECONSTRUCTED BREAST  2012    Procedure:REVISE RECONSTRUCTED BREAST; LEFT BREAST CAPSULORRAPHY; Surgeon:KEVIN HAWKINS; Location:[facility]    SHOULDER SURGERY      TOE SURGERY               Social History:     Social History     Tobacco Use    Smoking status: Former     Years: 20.00     Types: Cigarettes     Quit date: 2005     Years since quittin.7    Smokeless tobacco: Never   Substance Use Topics    Alcohol use: Yes     Alcohol/week: 0.0 standard drinks of alcohol     Comment: 3-4 drinks monthly             Family History:   No family history of bleeding difficulty, anesthesia problems or blood clots.         Allergies:  "    Allergies   Allergen Reactions    No Known Drug Allergy     Latex Rash             Medications:     Current Outpatient Medications   Medication Sig Dispense Refill    amLODIPine (NORVASC) 5 MG tablet Take 1 tablet (5 mg) by mouth daily 90 tablet 3    clobetasol (TEMOVATE) 0.05 % external cream Apply topically 2 times daily 15 g 0    Multiple Vitamins-Minerals (MULTIVITAMIN GUMMIES ADULTS) CHEW Take 2 chew tab by mouth daily                 Review of Systems:     No history of bleeding difficulty, anesthesia problems or blood clots.  The 10 point Review of Systems is negative other than noted in the HPI           Physical Exam:   Blood pressure 128/82, pulse 63, temperature 97.7  F (36.5  C), temperature source Temporal, resp. rate 16, height 1.626 m (5' 4\"), weight 74.8 kg (165 lb), SpO2 96 %, not currently breastfeeding.    Constitutional: healthy appearing, alert and in no distress  Heent: Normocephalic. Head without obvious masses or lesions. PERRLDC, EOMI. Mouth exam within normal limits: tongue, mucous membranes, posterior pharynx all normal, no lesions or abnormalities seen. Neck supple, no nuchal rigidity or masses. No supraclavicular, or cervical adenopathy. Thyroid symmetric, no masses.  Cardiovascular: Regular rate and rhythm, no murmer, rub or gallops.  JVP not elevated, no edema.  Carotids within normal limits bilaterally, no bruits.  Respiratory: Normal respiratory effort.  Lungs clear, normal flow, no wheezing or crackles.  Gastrointestinal: Normal active bowel sounds.   Soft, not tender, no masses, guarding or rebound.  No hepatosplenomegaly.   Musculoskeletal: extremities normal, no gross deformities noted.  Skin: no suspicious lesions or rashes   Neurologic: Mental status within normal limits.  Speech fluent.  No gross motor abnormalities and gait intact.  Psychiatric: mentation appears normal and affect normal.         Data:   Labs sent        Assessment:   Normal pre op exam for cataract " surgery, the patient should be low risk for the procedure  Hypertension, good control  Eczema, using medication very infrequently and appropriately  Ascending aortic dilatation, abnormal CT chest.  I will order a follow-up CT for this  Breast cancer, no evidence of disease  Fatigue, doubt pathologic, check labs  Healthcare maintenance         Plan:   The patient is okay for the anticipated procedure and can take her amlodipine the morning of surgery with a sip of water  Labs today  Routine DEXA scan  CT chest      Akil Pal M.D.

## 2023-10-05 NOTE — RESULT ENCOUNTER NOTE
Your bone density test looks quite good.  You have a very slight amount of bone loss but nothing serious.  As preventive measures please be sure to exercise regularly, take vitamin D3 at 1000 units a day, and get in 1200 mg a day of calcium through diet or supplementation.    Please let me know if you have questions.    Akil Pal M.D.

## 2023-10-27 ENCOUNTER — HOSPITAL ENCOUNTER (OUTPATIENT)
Dept: CT IMAGING | Facility: CLINIC | Age: 73
Discharge: HOME | End: 2023-10-27
Attending: INTERNAL MEDICINE | Admitting: INTERNAL MEDICINE
Payer: COMMERCIAL

## 2023-10-27 DIAGNOSIS — R93.89 ABNORMAL CT OF THE CHEST: ICD-10-CM

## 2023-10-27 PROCEDURE — 71250 CT THORAX DX C-: CPT

## 2024-01-07 ENCOUNTER — HEALTH MAINTENANCE LETTER (OUTPATIENT)
Age: 74
End: 2024-01-07

## 2024-11-04 DIAGNOSIS — I10 PRIMARY HYPERTENSION: ICD-10-CM

## 2024-11-04 RX ORDER — AMLODIPINE BESYLATE 5 MG/1
5 TABLET ORAL DAILY
Qty: 90 TABLET | Refills: 0 | Status: SHIPPED | OUTPATIENT
Start: 2024-11-04

## 2024-12-02 SDOH — HEALTH STABILITY: PHYSICAL HEALTH: ON AVERAGE, HOW MANY MINUTES DO YOU ENGAGE IN EXERCISE AT THIS LEVEL?: 20 MIN

## 2024-12-02 SDOH — HEALTH STABILITY: PHYSICAL HEALTH: ON AVERAGE, HOW MANY DAYS PER WEEK DO YOU ENGAGE IN MODERATE TO STRENUOUS EXERCISE (LIKE A BRISK WALK)?: 7 DAYS

## 2024-12-02 ASSESSMENT — SOCIAL DETERMINANTS OF HEALTH (SDOH): HOW OFTEN DO YOU GET TOGETHER WITH FRIENDS OR RELATIVES?: ONCE A WEEK

## 2024-12-05 ENCOUNTER — OFFICE VISIT (OUTPATIENT)
Dept: FAMILY MEDICINE | Facility: CLINIC | Age: 74
End: 2024-12-05
Payer: COMMERCIAL

## 2024-12-05 VITALS
SYSTOLIC BLOOD PRESSURE: 128 MMHG | TEMPERATURE: 96.2 F | DIASTOLIC BLOOD PRESSURE: 85 MMHG | OXYGEN SATURATION: 96 % | HEIGHT: 63 IN | BODY MASS INDEX: 30.19 KG/M2 | HEART RATE: 65 BPM | RESPIRATION RATE: 16 BRPM | WEIGHT: 170.4 LBS

## 2024-12-05 DIAGNOSIS — K63.5 POLYP OF COLON, UNSPECIFIED PART OF COLON, UNSPECIFIED TYPE: ICD-10-CM

## 2024-12-05 DIAGNOSIS — E78.5 HYPERLIPIDEMIA LDL GOAL <100: ICD-10-CM

## 2024-12-05 DIAGNOSIS — M85.851 OSTEOPENIA OF FEMORAL NECK, BILATERAL: ICD-10-CM

## 2024-12-05 DIAGNOSIS — R93.89 ABNORMAL CT OF THE CHEST: ICD-10-CM

## 2024-12-05 DIAGNOSIS — M85.852 OSTEOPENIA OF FEMORAL NECK, BILATERAL: ICD-10-CM

## 2024-12-05 DIAGNOSIS — L30.9 ECZEMA, UNSPECIFIED TYPE: ICD-10-CM

## 2024-12-05 DIAGNOSIS — I10 PRIMARY HYPERTENSION: ICD-10-CM

## 2024-12-05 DIAGNOSIS — I77.810 ASCENDING AORTA DILATION (H): ICD-10-CM

## 2024-12-05 DIAGNOSIS — Z00.00 ENCOUNTER FOR MEDICARE ANNUAL WELLNESS EXAM: Primary | ICD-10-CM

## 2024-12-05 LAB
ALBUMIN SERPL BCG-MCNC: 4.3 G/DL (ref 3.5–5.2)
ALP SERPL-CCNC: 127 U/L (ref 40–150)
ALT SERPL W P-5'-P-CCNC: 19 U/L (ref 0–50)
ANION GAP SERPL CALCULATED.3IONS-SCNC: 7 MMOL/L (ref 7–15)
AST SERPL W P-5'-P-CCNC: 24 U/L (ref 0–45)
BILIRUB SERPL-MCNC: 0.6 MG/DL
BUN SERPL-MCNC: 18.4 MG/DL (ref 8–23)
CALCIUM SERPL-MCNC: 9.2 MG/DL (ref 8.8–10.4)
CHLORIDE SERPL-SCNC: 105 MMOL/L (ref 98–107)
CHOLEST SERPL-MCNC: 194 MG/DL
CREAT SERPL-MCNC: 0.65 MG/DL (ref 0.51–0.95)
EGFRCR SERPLBLD CKD-EPI 2021: >90 ML/MIN/1.73M2
ERYTHROCYTE [DISTWIDTH] IN BLOOD BY AUTOMATED COUNT: 13.1 % (ref 10–15)
FASTING STATUS PATIENT QL REPORTED: YES
FASTING STATUS PATIENT QL REPORTED: YES
GLUCOSE SERPL-MCNC: 97 MG/DL (ref 70–99)
HCO3 SERPL-SCNC: 26 MMOL/L (ref 22–29)
HCT VFR BLD AUTO: 42.4 % (ref 35–47)
HDLC SERPL-MCNC: 86 MG/DL
HGB BLD-MCNC: 13.6 G/DL (ref 11.7–15.7)
LDLC SERPL CALC-MCNC: 90 MG/DL
MCH RBC QN AUTO: 28.2 PG (ref 26.5–33)
MCHC RBC AUTO-ENTMCNC: 32.1 G/DL (ref 31.5–36.5)
MCV RBC AUTO: 88 FL (ref 78–100)
NONHDLC SERPL-MCNC: 108 MG/DL
PLATELET # BLD AUTO: 265 10E3/UL (ref 150–450)
POTASSIUM SERPL-SCNC: 4.3 MMOL/L (ref 3.4–5.3)
PROT SERPL-MCNC: 6.8 G/DL (ref 6.4–8.3)
RBC # BLD AUTO: 4.83 10E6/UL (ref 3.8–5.2)
SODIUM SERPL-SCNC: 138 MMOL/L (ref 135–145)
TRIGL SERPL-MCNC: 91 MG/DL
WBC # BLD AUTO: 6.4 10E3/UL (ref 4–11)

## 2024-12-05 RX ORDER — CLOBETASOL PROPIONATE 0.5 MG/G
CREAM TOPICAL 2 TIMES DAILY PRN
Qty: 15 G | Refills: 0 | Status: SHIPPED | OUTPATIENT
Start: 2024-12-05

## 2024-12-05 RX ORDER — AMLODIPINE BESYLATE 5 MG/1
5 TABLET ORAL DAILY
Qty: 90 TABLET | Refills: 3 | Status: SHIPPED | OUTPATIENT
Start: 2024-12-05

## 2024-12-05 ASSESSMENT — PAIN SCALES - GENERAL: PAINLEVEL_OUTOF10: MILD PAIN (2)

## 2024-12-05 NOTE — PATIENT INSTRUCTIONS
I would get the flu shot and shingrix shot at your pharmacy.Patient Education   Preventive Care Advice   This is general advice given by our system to help you stay healthy. However, your care team may have specific advice just for you. Please talk to your care team about your preventive care needs.  Nutrition  Eat 5 or more servings of fruits and vegetables each day.  Try wheat bread, brown rice and whole grain pasta (instead of white bread, rice, and pasta).  Get enough calcium and vitamin D. Check the label on foods and aim for 100% of the RDA (recommended daily allowance).  Lifestyle  Exercise at least 150 minutes each week  (30 minutes a day, 5 days a week).  Do muscle strengthening activities 2 days a week. These help control your weight and prevent disease.  No smoking.  Wear sunscreen to prevent skin cancer.  Have a dental exam and cleaning every 6 months.  Yearly exams  See your health care team every year to talk about:  Any changes in your health.  Any medicines your care team has prescribed.  Preventive care, family planning, and ways to prevent chronic diseases.  Shots (vaccines)   HPV shots (up to age 26), if you've never had them before.  Hepatitis B shots (up to age 59), if you've never had them before.  COVID-19 shot: Get this shot when it's due.  Flu shot: Get a flu shot every year.  Tetanus shot: Get a tetanus shot every 10 years.  Pneumococcal, hepatitis A, and RSV shots: Ask your care team if you need these based on your risk.  Shingles shot (for age 50 and up)  General health tests  Diabetes screening:  Starting at age 35, Get screened for diabetes at least every 3 years.  If you are younger than age 35, ask your care team if you should be screened for diabetes.  Cholesterol test: At age 39, start having a cholesterol test every 5 years, or more often if advised.  Bone density scan (DEXA): At age 50, ask your care team if you should have this scan for osteoporosis (brittle bones).  Hepatitis C:  Get tested at least once in your life.  STIs (sexually transmitted infections)  Before age 24: Ask your care team if you should be screened for STIs.  After age 24: Get screened for STIs if you're at risk. You are at risk for STIs (including HIV) if:  You are sexually active with more than one person.  You don't use condoms every time.  You or a partner was diagnosed with a sexually transmitted infection.  If you are at risk for HIV, ask about PrEP medicine to prevent HIV.  Get tested for HIV at least once in your life, whether you are at risk for HIV or not.  Cancer screening tests  Cervical cancer screening: If you have a cervix, begin getting regular cervical cancer screening tests starting at age 21.  Breast cancer scan (mammogram): If you've ever had breasts, begin having regular mammograms starting at age 40. This is a scan to check for breast cancer.  Colon cancer screening: It is important to start screening for colon cancer at age 45.  Have a colonoscopy test every 10 years (or more often if you're at risk) Or, ask your provider about stool tests like a FIT test every year or Cologuard test every 3 years.  To learn more about your testing options, visit:   .  For help making a decision, visit:   https://bit.ly/vm25832.  Prostate cancer screening test: If you have a prostate, ask your care team if a prostate cancer screening test (PSA) at age 55 is right for you.  Lung cancer screening: If you are a current or former smoker ages 50 to 80, ask your care team if ongoing lung cancer screenings are right for you.  For informational purposes only. Not to replace the advice of your health care provider. Copyright   2023 [employer]. All rights reserved. Clinically reviewed by the [facility] Transitions Program. [employer] 337860 - REV 01/24.  Hearing Loss: Care Instructions  Overview     Hearing loss is a sudden or slow decrease in how well you hear. It can range from slight to profound.  Permanent hearing loss can occur with aging. It also can happen when you are exposed long-term to loud noise. Examples include listening to loud music, riding motorcycles, or being around other loud machines.  Hearing loss can affect your work and home life. It can make you feel lonely or depressed. You may feel that you have lost your independence. But hearing aids and other devices can help you hear better and feel connected to others.  Follow-up care is a key part of your treatment and safety. Be sure to make and go to all appointments, and call your doctor if you are having problems. It's also a good idea to know your test results and keep a list of the medicines you take.  How can you care for yourself at home?  Avoid loud noises whenever possible. This helps keep your hearing from getting worse.  Always wear hearing protection around loud noises.  Wear a hearing aid as directed.  A professional can help you pick a hearing aid that will work best for you.  You can also get hearing aids over the counter for mild to moderate hearing loss.  Have hearing tests as your doctor suggests. They can show whether your hearing has changed. Your hearing aid may need to be adjusted.  Use other devices as needed. These may include:  Telephone amplifiers and hearing aids that can connect to a television, stereo, radio, or microphone.  Devices that use lights or vibrations. These alert you to the doorbell, a ringing telephone, or a baby monitor.  Television closed-captioning. This shows the words at the bottom of the screen. Most new TVs can do this.  TTY (text telephone). This lets you type messages back and forth on the telephone instead of talking or listening. These devices are also called TDD. When messages are typed on the keyboard, they are sent over the phone line to a receiving TTY. The message is shown on a monitor.  Use text messaging, social media, and email if it is hard for you to communicate by telephone.  Try to  "learn a listening technique called speechreading. It is not lipreading. You pay attention to people's gestures, expressions, posture, and tone of voice. These clues can help you understand what a person is saying. Face the person you are talking to, and have them face you. Make sure the lighting is good. You need to see the other person's face clearly.  Think about counseling if you need help to adjust to your hearing loss.  When should you call for help?  Watch closely for changes in your health, and be sure to contact your doctor if:    You think your hearing is getting worse.     You have new symptoms, such as dizziness or nausea.   Where can you learn more?  Go to https://www.[employer]FX[employer].net/patiented  Enter R798 in the search box to learn more about \"Hearing Loss: Care Instructions.\"  Current as of: September 27, 2023  Content Version: 14.2 2024 [employer].   Care instructions adapted under license by your healthcare professional. If you have questions about a medical condition or this instruction, always ask your healthcare professional. [employer] disclaims any warranty or liability for your use of this information.       "

## 2024-12-05 NOTE — PROGRESS NOTES
Preventive Care Visit  [facility] JENNIFER Pal MD, Internal Medicine  Dec 5, 2024          Sergio Culp is a 74 year old, presenting for the following:    Unfortunately her   of heart failure few months ago.  She is doing relatively well in that respect.  She has 1 daughter who is 50 lives at home.  No grandkids.  She is working part-time.    She feels well.  No complaints on review of systems.               Past Medical History:      Past Medical History:   Diagnosis Date    Abnormal CT of the chest 2021    fu 10/22 stable, fu 10/23 stable    Arthritis     Ascending aorta dilation (H) 2021    on ct done for sob, 3.9cm in     Cancer (H) 2004 &     Radiation & Mastectomy    Chest pain     pos est, then angiogram done and no cad    Colonic polyp 2012    [place], tubular adenoma, fu , fu 10/22 nl    DCIS (ductal carcinoma in situ) of breast     had xrt    History of breast cancer     left, bilat mast and reconstruction, Dr. Cuevas    Hyperlipidemia LDL goal <100 2019    Hypertension 2022    norvasc added by Dr. Mccracken    Osteopenia of femoral neck, bilateral 10/2023    no rx needed             Past Surgical History:      Past Surgical History:   Procedure Laterality Date    APPENDECTOMY      ARTHROPLASTY KNEE UNICOMPARTMENT  2012    Procedure: ARTHROPLASTY KNEE UNICOMPARTMENT;  RIGHT KNEE UNICOMPARTMENTAL ARTHROPLASTY ;  Surgeon: Dakota Haines MD;  Location:  OR    ARTHROPLASTY KNEE UNICOMPARTMENT Left 2016    Procedure: ARTHROPLASTY KNEE UNICOMPARTMENT;  Surgeon: Dakota Haines MD;  Location:  OR    BIOPSY   &     BREAST RECONSTRUCTION WITH DEEP INFERIOR EPIGASTRIC  (ABRAHAM) FLAP,      bunion surgery   and     BUNIONECTOMY IVETH  2012    Procedure:BUNIONECTOMY IVETH; RIGHT HALLUX VALGUS WITH SECOND CLAWTOE RECONSTRUCTION; Surgeon:PHONG HENDRICKSON; [facility]  TUNNEL RELEASE RT/LT      CATARACT EXTRACTION  2020    also 2023    COLONOSCOPY N/A 10/03/2022    Procedure: COLONOSCOPY;  Surgeon: Gabrielle Mack MD;  Location:  GI    CV CORONARY ANGIOGRAM N/A 2019    Procedure: Coronary Angiogram;  Surgeon: Jose Luis Montero MD;  Location:  HEART CARDIAC CATH LAB    EXCISE LESION TRUNK N/A 2015    Procedure: EXCISE LESION TRUNK;  Surgeon: Jose Luis Hawkins MD;  Location: [facility]    EYE SURGERY      Cataract surgery right eye    FOOT SURGERY  2017    at [facility]    fractured arm  2009    MASTECTOMY BILATERAL, INSERT TISSUE EXPANDER BILATERAL, COMBINED  2008    breast cancer    RECONSTRUCT BREAST Left 2015    Procedure: RECONSTRUCT BREAST;  Surgeon: Jose Luis Hawkins MD;  Location: [facility]    REVISE RECONSTRUCTED BREAST  2012    Procedure:REVISE RECONSTRUCTED BREAST; LEFT BREAST CAPSULORRAPHY; Surgeon:JOSE LUIS HAWKINS; Location:[facility]    SHOULDER SURGERY      TOE SURGERY               Social History:     Social History     Socioeconomic History    Marital status:      Spouse name: Not on file    Number of children: 1    Years of education:       Highest education level: Not on file   Occupational History    Occupation: works dental lab as    Tobacco Use    Smoking status: Former     Current packs/day: 0.00     Average packs/day: 0.5 packs/day for 20.0 years (10.0 ttl pk-yrs)     Types: Cigarettes     Start date: 8/15/1977     Quit date: 2005     Years since quittin.8    Smokeless tobacco: Never   Substance and Sexual Activity    Alcohol use: Yes     Comment: 3-4 drinks monthly    Drug use: Never    Sexual activity: Not Currently     Partners: Male     Birth control/protection: None   Other Topics Concern    Parent/sibling w/ CABG, MI or angioplasty before 65F 55M? No   Social History Narrative    Not on file     Social Drivers of Health     Financial Resource Strain: Low Risk   (12/2/2024)    Financial Resource Strain     Within the past 12 months, have you or your family members you live with been unable to get utilities (heat, electricity) when it was really needed?: No   Food Insecurity: Low Risk  (12/2/2024)    Food Insecurity     Within the past 12 months, did you worry that your food would run out before you got money to buy more?: No     Within the past 12 months, did the food you bought just not last and you didn t have money to get more?: No   Transportation Needs: Low Risk  (12/2/2024)    Transportation Needs     Within the past 12 months, has lack of transportation kept you from medical appointments, getting your medicines, non-medical meetings or appointments, work, or from getting things that you need?: No   Physical Activity: Insufficiently Active (12/2/2024)    Exercise Vital Sign     Days of Exercise per Week: 7 days     Minutes of Exercise per Session: 20 min   Stress: No Stress Concern Present (12/2/2024)    [employer] [place] [employer] - Occupational Stress Questionnaire     Feeling of Stress : Not at all   Social Connections: Unknown (12/2/2024)    Social Connection and Isolation Panel [NHANES]     Frequency of Communication with Friends and Family: Not on file     Frequency of Social Gatherings with Friends and Family: Once a week     Attends Congregational Services: Not on file     Active Member of Clubs or Organizations: Not on file     Attends Club or Organization Meetings: Not on file     Marital Status: Not on file   Interpersonal Safety: Low Risk  (12/5/2024)    Interpersonal Safety     Do you feel physically and emotionally safe where you currently live?: Yes     Within the past 12 months, have you been hit, slapped, kicked or otherwise physically hurt by someone?: No     Within the past 12 months, have you been humiliated or emotionally abused in other ways by your partner or ex-partner?: No   Housing Stability: Low Risk  (12/2/2024)    Housing Stability  "    Do you have housing? : Yes     Are you worried about losing your housing?: No             Family History:   reviewed         Allergies:     Allergies   Allergen Reactions    No Known Drug Allergy     Latex Rash             Medications:     Current Outpatient Medications   Medication Sig Dispense Refill    amLODIPine (NORVASC) 5 MG tablet Take 1 tablet (5 mg) by mouth daily. 90 tablet 3    clobetasol propionate (TEMOVATE) 0.05 % external cream Apply topically 2 times daily as needed. 15 g 0    Multiple Vitamins-Minerals (MULTIVITAMIN GUMMIES ADULTS) CHEW Take 2 chew tab by mouth daily      UNABLE TO FIND daily. MEDICATION NAME: Immuno 1-50                 Review of Systems:     The 10 point Review of Systems is negative other than noted in the HPI           Physical Exam:   Blood pressure 128/85, pulse 65, temperature (!) 96.2  F (35.7  C), temperature source Temporal, resp. rate 16, height 1.61 m (5' 3.39\"), weight 77.3 kg (170 lb 6.4 oz), SpO2 96%, not currently breastfeeding.    Exam:  Constitutional: healthy appearing, alert and in no distress  Heent: Normocephalic. Head without obvious masses or lesions. PERRLDC, EOMI. Mouth exam within normal limits: tongue, mucous membranes, posterior pharynx all normal, no lesions or abnormalities seen.  Tm's and canals within normal limits bilaterally. Neck supple, no nuchal rigidity or masses. No supraclavicular, or cervical adenopathy. Thyroid symmetric, no masses.  Cardiovascular: Regular rate and rhythm, no murmer, rub or gallops.  JVP not elevated, no edema.  Carotids within normal limits bilaterally, no bruits.  Respiratory: Normal respiratory effort.  Lungs clear, normal flow, no wheezing or crackles.  Gastrointestinal: Normal active bowel sounds.   Soft, not tender, no masses, guarding or rebound.  No hepatosplenomegaly.   Musculoskeletal: extremities normal, no gross deformities noted.  Skin: no suspicious lesions or rashes   Neurologic: Mental status within " normal limits.  Speech fluent.  No gross motor abnormalities and gait intact.  Psychiatric: mentation appears normal and affect normal.         Data:   Labs sent        Assessment:   Normal complete physical exam  Abnormal CT chest, ascending aortic dilatation, I will do another CT this year and if that stable I think we can wait at least 3 years for the next 1  Osteopenia, calcium and vitamin D  Hypertension, controlled  Hyperlipidemia, labs today  Colon polyp, no follow-up needed  Healthcare maintenance         Plan:   She does not want a COVID shot  Flu and Shingrix at pharmacy  CT chest  Exercise and diet  Letter with labs      Akil Pal M.D.            Physical (Fasting )            HPI          Health Care Directive  Patient has a Health Care Directive on file        12/2/2024   General Health   How would you rate your overall physical health? Good   Feel stress (tense, anxious, or unable to sleep) Not at all            12/2/2024   Nutrition   Diet: Regular (no restrictions)            12/2/2024   Exercise   Days per week of moderate/strenous exercise 7 days   Average minutes spent exercising at this level 20 min            12/2/2024   Social Factors   Frequency of gathering with friends or relatives Once a week   Worry food won't last until get money to buy more No   Food not last or not have enough money for food? No   Do you have housing? (Housing is defined as stable permanent housing and does not include staying ouside in a car, in a tent, in an abandoned building, in an overnight shelter, or couch-surfing.) Yes   Are you worried about losing your housing? No   Lack of transportation? No   Unable to get utilities (heat,electricity)? No            12/2/2024   Fall Risk   Fallen 2 or more times in the past year? No     No    Trouble with walking or balance? No     No        Patient-reported    Multiple values from one day are sorted in reverse-chronological order          12/2/2024   Activities of Daily  Living- Home Safety   Needs help with the following daily activites None of the above   Safety concerns in the home None of the above            2024   Dental   Dentist two times every year? Yes            2024   Hearing Screening   Hearing concerns? (!) IT'S HARD TO FOLLOW A CONVERSATION IN A NOISY RESTAURANT OR CROWDED ROOM.    (!) TROUBLE UNDERSTANDING SOFT OR WHISPERED SPEECH.       Multiple values from one day are sorted in reverse-chronological order         2024   Driving Risk Screening   Patient/family members have concerns about driving No            2024   General Alertness/Fatigue Screening   Have you been more tired than usual lately? No            2024   Urinary Incontinence Screening   Bothered by leaking urine in past 6 months No            2024   TB Screening   Were you born outside of the [place]? No            Today's PHQ-2 Score:       2024     7:37 AM   PHQ-2 (  [employer])   Q1: Little interest or pleasure in doing things 0    Q2: Feeling down, depressed or hopeless 0    PHQ-2 Score 0    Q1: Little interest or pleasure in doing things Not at all   Q2: Feeling down, depressed or hopeless Not at all   PHQ-2 Score 0       Patient-reported           2024   Substance Use   Alcohol more than 3/day or more than 7/wk No   Do you have a current opioid prescription? No   How severe/bad is pain from 1 to 10? 2/10   Do you use any other substances recreationally? No        Social History     Tobacco Use    Smoking status: Former     Current packs/day: 0.00     Average packs/day: 0.5 packs/day for 20.0 years (10.0 ttl pk-yrs)     Types: Cigarettes     Start date: 8/15/1977     Quit date: 2005     Years since quittin.8    Smokeless tobacco: Never   Substance Use Topics    Alcohol use: Yes     Comment: 3-4 drinks monthly    Drug use: Never              ASCVD Risk   The 10-year ASCVD risk score (Lexie YOUNG, et al., 2019) is: 18.7%    Values used to calculate  "the score:      Age: 74 years      Sex: Female      Is Non- : No      Diabetic: No      Tobacco smoker: No      Systolic Blood Pressure: 128 mmHg      Is BP treated: Yes      HDL Cholesterol: 80 mg/dL      Total Cholesterol: 186 mg/dL            Reviewed and updated as needed this visit by Provider       Med Hx                Current providers sharing in care for this patient include:  Patient Care Team:  Akil Pal MD as PCP - General (Internal Medicine)  Akil Pal MD as Assigned PCP    The following health maintenance items are reviewed in Epic and correct as of today:  Health Maintenance   Topic Date Due    ZOSTER IMMUNIZATION (2 of 3) 12/22/2015    ANNUAL REVIEW OF HM ORDERS  05/27/2023    INFLUENZA VACCINE (1) 09/01/2024    COVID-19 Vaccine (4 - 2024-25 season) 09/01/2024    BMP  10/05/2024    LIPID  10/05/2024    RSV VACCINE (1 - 1-dose 75+ series) 07/01/2025    MEDICARE ANNUAL WELLNESS VISIT  12/05/2025    FALL RISK ASSESSMENT  12/05/2025    GLUCOSE  10/05/2026    ADVANCE CARE PLANNING  10/17/2027    DTAP/TDAP/TD IMMUNIZATION (3 - Td or Tdap) 10/17/2032    DEXA  10/05/2038    HEPATITIS C SCREENING  Completed    PHQ-2 (once per calendar year)  Completed    Pneumococcal Vaccine: 65+ Years  Completed    HPV IMMUNIZATION  Aged Out    MENINGITIS IMMUNIZATION  Aged Out    RSV MONOCLONAL ANTIBODY  Aged Out    COLORECTAL CANCER SCREENING  Discontinued    LUNG CANCER SCREENING  Discontinued            Objective    Exam  /85 (BP Location: Left arm, Patient Position: Sitting, Cuff Size: Adult Large)   Pulse 65   Temp (!) 96.2  F (35.7  C) (Temporal)   Resp 16   Ht 1.61 m (5' 3.39\")   Wt 77.3 kg (170 lb 6.4 oz)   SpO2 96%   BMI 29.82 kg/m     Estimated body mass index is 29.82 kg/m  as calculated from the following:    Height as of this encounter: 1.61 m (5' 3.39\").    Weight as of this encounter: 77.3 kg (170 lb 6.4 oz).    Physical Exam           12/5/2024 "   Mini Cog   Clock Draw Score 2 Normal   3 Item Recall 3 objects recalled   Mini Cog Total Score 5                 Signed Electronically by: Akil Pal MD

## 2024-12-05 NOTE — RESULT ENCOUNTER NOTE
It was a pleasure seeing you for your physical examination.  I wanted to get back to you with your test results. You should be able to view them.    I am happy to report that your cbc or complete blood count is normal with no signs of anemia, leukemia or platelet abnormalities. Your chemistry panel shows no signs of diabetes.  Your blood salts, kidney tests, liver tests, and proteins are all fine.    Your total cholesterol is 194 with the normal range being below 200.  Your HDL or good cholesterol is 86 with the normal range being above 50.  Your LDL or bad cholesterol is 90 with the normal range being below 130.  These numbers are very good.    I am happy to bring you this excellent report.  Please let me know if you have questions.    Akil Pal M.D.

## 2025-02-11 ENCOUNTER — TRANSFERRED RECORDS (OUTPATIENT)
Dept: HEALTH INFORMATION MANAGEMENT | Facility: CLINIC | Age: 75
End: 2025-02-11
Payer: COMMERCIAL

## 2025-04-29 ENCOUNTER — OFFICE VISIT (OUTPATIENT)
Dept: FAMILY MEDICINE | Facility: CLINIC | Age: 75
End: 2025-04-29
Payer: COMMERCIAL

## 2025-04-29 VITALS
HEART RATE: 74 BPM | RESPIRATION RATE: 16 BRPM | DIASTOLIC BLOOD PRESSURE: 76 MMHG | TEMPERATURE: 97.6 F | HEIGHT: 63 IN | BODY MASS INDEX: 29.41 KG/M2 | OXYGEN SATURATION: 98 % | SYSTOLIC BLOOD PRESSURE: 137 MMHG | WEIGHT: 166 LBS

## 2025-04-29 DIAGNOSIS — I10 PRIMARY HYPERTENSION: ICD-10-CM

## 2025-04-29 DIAGNOSIS — Z85.3 HISTORY OF BREAST CANCER: ICD-10-CM

## 2025-04-29 DIAGNOSIS — H02.403 EYELID DROOPING DISEASE, BILATERAL: ICD-10-CM

## 2025-04-29 DIAGNOSIS — Z01.818 PRE-OP EXAM: Primary | ICD-10-CM

## 2025-04-29 DIAGNOSIS — E78.5 HYPERLIPIDEMIA LDL GOAL <100: ICD-10-CM

## 2025-04-29 PROCEDURE — 3078F DIAST BP <80 MM HG: CPT

## 2025-04-29 PROCEDURE — 99214 OFFICE O/P EST MOD 30 MIN: CPT

## 2025-04-29 PROCEDURE — 1126F AMNT PAIN NOTED NONE PRSNT: CPT

## 2025-04-29 PROCEDURE — 3075F SYST BP GE 130 - 139MM HG: CPT

## 2025-04-29 ASSESSMENT — PAIN SCALES - GENERAL: PAINLEVEL_OUTOF10: NO PAIN (0)

## 2025-04-29 NOTE — PATIENT INSTRUCTIONS
1. One week prior to surgery do not take NSAIDS (Alleve, ibuprofen, aspirin, etc) or any over-the-counter pain medications other than Tylenol.  TYLENOL is the safest pain pill to use before surgery because it does not affect your bleeding time. If Tylenol is not sufficient for pain control talk to me or the surgeon and we will decide what is safe to use.     2. Do not eat anything after midnight (evening of the surgery) and nothing the morning of the surgery.     3. Follow all instructions given by the surgery team. They usually give out a packet. Read it and please follow it precisely. This helps surgical experience and outcomes.     4. If you have any questions do not hesitate to call me or the surgeon/surgical team.

## 2025-04-29 NOTE — PROGRESS NOTES
Preoperative Evaluation  [facility]  [address] [facility], [address]  [facility] [address]  Phone: [phone]  Primary Provider: Akil Pal MD  Pre-op Performing Provider: Izzy Cowan DNP  Apr 29, 2025 4/24/2025   Surgical Information   What procedure is being done? Eye lid surgery   Facility or Hospital where procedure/surgery will be performed: [facility]   Who is doing the procedure / surgery? Dr Candelario   Date of surgery / procedure: May 7, 2025   Time of surgery / procedure: 10:00   Where do you plan to recover after surgery? at home with family     Fax number for surgical facility: [phone]    Assessment & Plan     The proposed surgical procedure is considered LOW risk.    Pre-op exam  Eyelid drooping disease, bilateral  Medically stable to undergo procedure, no labs or EKG required today. Reviewed stable labs from 12/2024, able to do >4 METS without cardiac symptoms.     Hyperlipidemia LDL goal <100  Stable, not on medications    Primary hypertension  Stable on amlodipine, can continue     History of breast cancer  Remote            - No identified additional risk factors other than previously addressed    Antiplatelet or Anticoagulation Medication Instructions   - We reviewed the medication list and the patient is not on an antiplatelet or anticoagulation medications.    Additional Medication Instructions   - Herbal medications and vitamins: DO NOT TAKE 14 days prior to surgery.   - Calcium Channel Blockers (amlodipine, diltiazem, felodipine): May be continued on the day of surgery.    Recommendation  Approval given to proceed with proposed procedure, without further diagnostic evaluation.        Sergio Culp is a 74 year old, presenting for the following:  Pre-Op Exam          4/29/2025     8:01 AM   Additional Questions   Roomed by Bere DOVE: Feeling healthy and well to proceed with procedure. No hx of asthma, RAYMOND, or COPD. No problems in the  past with anesthesia.  No history of MI, CVA/TIA, DVT/PE.  Denies chest pain, shortness of breath, dyspnea on exertion, heart racing, or new leg swelling.            4/24/2025   Pre-Op Questionnaire   Have you ever had a heart attack or stroke? No   Have you ever had surgery on your heart or blood vessels, such as a stent placement, a coronary artery bypass, or surgery on an artery in your head, neck, heart, or legs? No   Do you have chest pain with activity? No   Do you have a history of heart failure? No   Do you currently have a cold, bronchitis or symptoms of other infection? No   Do you have a cough, shortness of breath, or wheezing? No   Do you or anyone in your family have previous history of blood clots? No   Do you or does anyone in your family have a serious bleeding problem such as prolonged bleeding following surgeries or cuts? No   Have you ever had problems with anemia or been told to take iron pills? No   Have you had any abnormal blood loss such as black, tarry or bloody stools, or abnormal vaginal bleeding? No   Have you ever had a blood transfusion? No   Are you willing to have a blood transfusion if it is medically needed before, during, or after your surgery? Yes   Have you or any of your relatives ever had problems with anesthesia? No   Do you have sleep apnea, excessive snoring or daytime drowsiness? No   Do you have any artifical heart valves or other implanted medical devices like a pacemaker, defibrillator, or continuous glucose monitor? No   Do you have artificial joints? (!) YES - knees   Are you allergic to latex? (!) YES     Advance Care Planning    Document on file is a Health Care Directive or POLST.    Preoperative Review of    reviewed - no record of controlled substances prescribed.      Status of Chronic Conditions:  HYPERTENSION - Patient has longstanding history of HTN , currently denies any symptoms referable to elevated blood pressure. Specifically denies chest pain,  palpitations, dyspnea, orthopnea, PND or peripheral edema. Blood pressure readings have been in normal range. Current medication regimen is as listed below. Patient denies any side effects of medication.     Patient Active Problem List    Diagnosis Date Noted    Osteopenia of femoral neck, bilateral 10/2023     Priority: Medium    Hypertension 10/17/2022     Priority: Medium    Abnormal CT of the chest 09/2021     Priority: Medium     fu 10/22 stable      Ascending aorta dilation 09/2021     Priority: Medium     on ct done for sob      Hyperlipidemia LDL goal <100 12/12/2019     Priority: Medium    Colonic polyp 03/01/2012     Priority: Medium     [place] [facility], tubular adenoma      History of breast cancer 2007     Priority: Medium     left, bilat mast and reconstruction, Dr. Cuevas        Past Medical History:   Diagnosis Date    Abnormal CT of the chest 09/2021    fu 10/22 stable, fu 10/23 stable    Arthritis 2012    Ascending aorta dilation 09/2021    on ct done for sob, 3.9cm in 2023    Cancer (H) 2004 & 2007    Radiation & Mastectomy    Chest pain 2019    pos est, then angiogram done and no cad    Colonic polyp 03/2012    [facility], tubular adenoma, fu 2017, fu 10/22 nl    DCIS (ductal carcinoma in situ) of breast 2004    had xrt    History of breast cancer 2007    left, bilat mast and reconstruction, Dr. Cuevas    Hyperlipidemia LDL goal <100 12/12/2019    Hypertension 5/22    norvasc added by Dr. Mccracken    Osteopenia of femoral neck, bilateral 10/2023    no rx needed     Past Surgical History:   Procedure Laterality Date    APPENDECTOMY  2002    ARTHROPLASTY KNEE UNICOMPARTMENT  09/13/2012    Procedure: ARTHROPLASTY KNEE UNICOMPARTMENT;  RIGHT KNEE UNICOMPARTMENTAL ARTHROPLASTY ;  Surgeon: Dakota Haines MD;  Location:  OR    ARTHROPLASTY KNEE UNICOMPARTMENT Left 05/24/2016    Procedure: ARTHROPLASTY KNEE UNICOMPARTMENT;  Surgeon: Dakota Haines MD;  Location:  OR    BIOPSY  2004 & 2007    BREAST  RECONSTRUCTION WITH DEEP INFERIOR EPIGASTRIC  (ABRAHAM) FLAP,      bunion surgery  1975 and 2007    BUNIONECTOMY IVETH  2012    Procedure:BUNIONECTOMY IVETH; RIGHT HALLUX VALGUS WITH SECOND CLAWTOE RECONSTRUCTION; Surgeon:PHONG HENDRICKSON; Location: OR    CARPAL TUNNEL RELEASE RT/LT      CATARACT EXTRACTION      also     COLONOSCOPY N/A 10/03/2022    Procedure: COLONOSCOPY;  Surgeon: Gabrielle Mack MD;  Location:  GI    CV CORONARY ANGIOGRAM N/A 2019    Procedure: Coronary Angiogram;  Surgeon: Kevin Montero MD;  Location:  HEART CARDIAC CATH LAB    EXCISE LESION TRUNK N/A 2015    Procedure: EXCISE LESION TRUNK;  Surgeon: Kevin Hawkins MD;  Location: [facility]    EYE SURGERY      Cataract surgery right eye    FOOT SURGERY  2017    at [facility]    fractured arm      MASTECTOMY BILATERAL, INSERT TISSUE EXPANDER BILATERAL, COMBINED      breast cancer    RECONSTRUCT BREAST Left 2015    Procedure: RECONSTRUCT BREAST;  Surgeon: Kevin Hawkins MD;  Location: [facility]    REVISE RECONSTRUCTED BREAST  2012    Procedure:REVISE RECONSTRUCTED BREAST; LEFT BREAST CAPSULORRAPHY; Surgeon:KEVIN HAWKINS; Location:[facility]    SHOULDER SURGERY      TOE SURGERY       Current Outpatient Medications   Medication Sig Dispense Refill    amLODIPine (NORVASC) 5 MG tablet Take 1 tablet (5 mg) by mouth daily. 90 tablet 3    clobetasol propionate (TEMOVATE) 0.05 % external cream Apply topically 2 times daily as needed. 15 g 0    UNABLE TO FIND daily. MEDICATION NAME: Immuno 1-50         Allergies   Allergen Reactions    No Known Drug Allergy     Latex Rash        Social History     Tobacco Use    Smoking status: Former     Current packs/day: 0.00     Average packs/day: 0.5 packs/day for 20.0 years (10.0 ttl pk-yrs)     Types: Cigarettes     Start date: 8/15/1977     Quit date: 2005     Years since quittin.2    Smokeless tobacco:  "Never   Substance Use Topics    Alcohol use: Yes     Comment: 3-4 drinks monthly     Family History   Problem Relation Age of Onset    Osteoporosis Mother     Hypertension Father     Pulmonary Embolism Brother          age 55     History   Drug Use Unknown             Review of Systems  Constitutional, HEENT, cardiovascular, pulmonary, gi and gu systems are negative, except as otherwise noted.    Objective    /76 (BP Location: Left arm, Patient Position: Sitting, Cuff Size: Adult Regular)   Pulse 74   Temp 97.6  F (36.4  C) (Temporal)   Resp 16   Ht 1.605 m (5' 3.2\")   Wt 75.3 kg (166 lb)   SpO2 98%   BMI 29.22 kg/m     Estimated body mass index is 29.22 kg/m  as calculated from the following:    Height as of this encounter: 1.605 m (5' 3.2\").    Weight as of this encounter: 75.3 kg (166 lb).  Physical Exam  Vitals reviewed.   HENT:      Head: Normocephalic.      Mouth/Throat:      Mouth: Mucous membranes are moist.      Pharynx: No oropharyngeal exudate or posterior oropharyngeal erythema.   Eyes:      Pupils: Pupils are equal, round, and reactive to light.   Cardiovascular:      Rate and Rhythm: Normal rate and regular rhythm.      Pulses: Normal pulses.      Heart sounds: Normal heart sounds. No murmur heard.  Pulmonary:      Effort: Pulmonary effort is normal. No respiratory distress.      Breath sounds: Normal breath sounds. No wheezing, rhonchi or rales.   Abdominal:      General: Bowel sounds are normal. There is no distension.      Palpations: Abdomen is soft. There is no mass.      Tenderness: There is no abdominal tenderness.   Musculoskeletal:         General: Normal range of motion.      Cervical back: Normal range of motion and neck supple.      Right lower leg: No edema.      Left lower leg: No edema.   Skin:     General: Skin is warm and dry.   Neurological:      Mental Status: She is alert and oriented to person, place, and time.   Psychiatric:         Mood and Affect: Mood normal.   "       Behavior: Behavior normal.           Recent Labs   Lab Test 12/05/24  0816   HGB 13.6         POTASSIUM 4.3   CR 0.65        Diagnostics  No labs were ordered during this visit.   No EKG required, no history of coronary heart disease, significant arrhythmia, peripheral arterial disease or other structural heart disease.    Revised Cardiac Risk Index (RCRI)  The patient has the following serious cardiovascular risks for perioperative complications:   - No serious cardiac risks = 0 points     RCRI Interpretation: 0 points: Class I (very low risk - 0.4% complication rate)         Signed Electronically by: Izzy Cowan, DNP, APRN, CNP    A copy of this evaluation report is provided to the requesting physician.

## 2025-05-05 DIAGNOSIS — I10 PRIMARY HYPERTENSION: ICD-10-CM

## 2025-05-05 RX ORDER — AMLODIPINE BESYLATE 5 MG/1
5 TABLET ORAL DAILY
Qty: 90 TABLET | Refills: 0 | OUTPATIENT
Start: 2025-05-05

## 2025-05-07 DIAGNOSIS — I10 PRIMARY HYPERTENSION: ICD-10-CM

## 2025-05-08 RX ORDER — AMLODIPINE BESYLATE 5 MG/1
5 TABLET ORAL DAILY
Qty: 90 TABLET | Refills: 0 | OUTPATIENT
Start: 2025-05-08

## (undated) DEVICE — INTRO GLIDESHEATH SLENDER 6FR 10X45CM 60-1060

## (undated) DEVICE — GUIDEWIRE VASC 0.035INX150CM INQWIRE J TIP IQ35F150J3F/A

## (undated) DEVICE — DEFIB PRO-PADZ LVP LQD GEL ADULT 8900-2105-01

## (undated) DEVICE — SLEEVE TR BAND RADIAL COMPRESSION DEVICE 24CM TRB24-REG

## (undated) DEVICE — RAD G/W INQWIRE .035X260CM J-TIP EXCHANGE IQ35F260J1O5RS

## (undated) DEVICE — GW VASC 190CM .014IN HI-TRQ 1009660J

## (undated) DEVICE — CATHETER DIAGNOSTIC 6FR DIA 100CML NYLON POLYURETHANE STAINL

## (undated) DEVICE — KIT HAND CONTROL ANGIOTOUCH ACIST 65CM AT-P65

## (undated) DEVICE — MANIFOLD KIT ANGIO AUTOMATED 014613

## (undated) RX ORDER — FENTANYL CITRATE 50 UG/ML
INJECTION, SOLUTION INTRAMUSCULAR; INTRAVENOUS
Status: DISPENSED
Start: 2022-10-03